# Patient Record
Sex: FEMALE | Race: WHITE | URBAN - METROPOLITAN AREA
[De-identification: names, ages, dates, MRNs, and addresses within clinical notes are randomized per-mention and may not be internally consistent; named-entity substitution may affect disease eponyms.]

---

## 2023-02-15 ENCOUNTER — HOSPITAL ENCOUNTER (OUTPATIENT)
Age: 63
Setting detail: OBSERVATION
Discharge: HOME | End: 2023-02-17
Attending: EMERGENCY MEDICINE | Admitting: INTERNAL MEDICINE
Payer: COMMERCIAL

## 2023-02-15 ENCOUNTER — APPOINTMENT (OUTPATIENT)
Dept: GENERAL RADIOLOGY | Age: 63
End: 2023-02-15
Payer: COMMERCIAL

## 2023-02-15 ENCOUNTER — APPOINTMENT (OUTPATIENT)
Dept: CT IMAGING | Age: 63
End: 2023-02-15
Payer: COMMERCIAL

## 2023-02-15 DIAGNOSIS — R55 SYNCOPE AND COLLAPSE: Primary | ICD-10-CM

## 2023-02-15 DIAGNOSIS — R07.9 ACUTE CHEST PAIN: ICD-10-CM

## 2023-02-15 LAB
A/G RATIO: 1.4 (ref 1.1–2.2)
ALBUMIN SERPL-MCNC: 4.2 G/DL (ref 3.4–5)
ALP BLD-CCNC: 85 U/L (ref 40–129)
ALT SERPL-CCNC: 12 U/L (ref 10–40)
ANION GAP SERPL CALCULATED.3IONS-SCNC: 9 MMOL/L (ref 3–16)
AST SERPL-CCNC: 17 U/L (ref 15–37)
BANDED NEUTROPHILS RELATIVE PERCENT: 1 % (ref 0–7)
BASOPHILS ABSOLUTE: 0 K/UL (ref 0–0.2)
BASOPHILS RELATIVE PERCENT: 0 %
BILIRUB SERPL-MCNC: 0.4 MG/DL (ref 0–1)
BUN BLDV-MCNC: 8 MG/DL (ref 7–20)
CALCIUM SERPL-MCNC: 9.8 MG/DL (ref 8.3–10.6)
CHLORIDE BLD-SCNC: 102 MMOL/L (ref 99–110)
CO2: 26 MMOL/L (ref 21–32)
CREAT SERPL-MCNC: 0.6 MG/DL (ref 0.6–1.2)
D DIMER: 1.15 UG/ML FEU (ref 0–0.6)
EOSINOPHILS ABSOLUTE: 2.7 K/UL (ref 0–0.6)
EOSINOPHILS RELATIVE PERCENT: 24 %
GFR SERPL CREATININE-BSD FRML MDRD: >60 ML/MIN/{1.73_M2}
GLUCOSE BLD-MCNC: 103 MG/DL (ref 70–99)
HCT VFR BLD CALC: 42.6 % (ref 36–48)
HEMATOLOGY PATH CONSULT: YES
HEMOGLOBIN: 14.2 G/DL (ref 12–16)
LYMPHOCYTES ABSOLUTE: 2.1 K/UL (ref 1–5.1)
LYMPHOCYTES RELATIVE PERCENT: 19 %
MCH RBC QN AUTO: 30.7 PG (ref 26–34)
MCHC RBC AUTO-ENTMCNC: 33.2 G/DL (ref 31–36)
MCV RBC AUTO: 92.4 FL (ref 80–100)
MONOCYTES ABSOLUTE: 0.7 K/UL (ref 0–1.3)
MONOCYTES RELATIVE PERCENT: 6 %
NEUTROPHILS ABSOLUTE: 5.7 K/UL (ref 1.7–7.7)
NEUTROPHILS RELATIVE PERCENT: 50 %
PDW BLD-RTO: 12.7 % (ref 12.4–15.4)
PLATELET # BLD: 386 K/UL (ref 135–450)
PMV BLD AUTO: 7.5 FL (ref 5–10.5)
POTASSIUM REFLEX MAGNESIUM: 3.9 MMOL/L (ref 3.5–5.1)
PRO-BNP: 11 PG/ML (ref 0–124)
RBC # BLD: 4.62 M/UL (ref 4–5.2)
SODIUM BLD-SCNC: 137 MMOL/L (ref 136–145)
TOTAL PROTEIN: 7.2 G/DL (ref 6.4–8.2)
TROPONIN: <0.01 NG/ML
WBC # BLD: 11.2 K/UL (ref 4–11)

## 2023-02-15 PROCEDURE — G0378 HOSPITAL OBSERVATION PER HR: HCPCS

## 2023-02-15 PROCEDURE — 85379 FIBRIN DEGRADATION QUANT: CPT

## 2023-02-15 PROCEDURE — 71260 CT THORAX DX C+: CPT | Performed by: EMERGENCY MEDICINE

## 2023-02-15 PROCEDURE — 71045 X-RAY EXAM CHEST 1 VIEW: CPT

## 2023-02-15 PROCEDURE — 83880 ASSAY OF NATRIURETIC PEPTIDE: CPT

## 2023-02-15 PROCEDURE — 6360000002 HC RX W HCPCS: Performed by: EMERGENCY MEDICINE

## 2023-02-15 PROCEDURE — 96375 TX/PRO/DX INJ NEW DRUG ADDON: CPT

## 2023-02-15 PROCEDURE — 6360000004 HC RX CONTRAST MEDICATION: Performed by: EMERGENCY MEDICINE

## 2023-02-15 PROCEDURE — 85025 COMPLETE CBC W/AUTO DIFF WBC: CPT

## 2023-02-15 PROCEDURE — 84484 ASSAY OF TROPONIN QUANT: CPT

## 2023-02-15 PROCEDURE — 99285 EMERGENCY DEPT VISIT HI MDM: CPT

## 2023-02-15 PROCEDURE — 6370000000 HC RX 637 (ALT 250 FOR IP): Performed by: INTERNAL MEDICINE

## 2023-02-15 PROCEDURE — 80053 COMPREHEN METABOLIC PANEL: CPT

## 2023-02-15 PROCEDURE — 2580000003 HC RX 258: Performed by: INTERNAL MEDICINE

## 2023-02-15 PROCEDURE — 96374 THER/PROPH/DIAG INJ IV PUSH: CPT

## 2023-02-15 RX ORDER — ONDANSETRON 2 MG/ML
4 INJECTION INTRAMUSCULAR; INTRAVENOUS EVERY 6 HOURS PRN
Status: DISCONTINUED | OUTPATIENT
Start: 2023-02-15 | End: 2023-02-17 | Stop reason: HOSPADM

## 2023-02-15 RX ORDER — FLUTICASONE PROPIONATE AND SALMETEROL 250; 50 UG/1; UG/1
1 POWDER RESPIRATORY (INHALATION) 2 TIMES DAILY
COMMUNITY

## 2023-02-15 RX ORDER — PANTOPRAZOLE SODIUM 40 MG/1
40 TABLET, DELAYED RELEASE ORAL
Status: DISCONTINUED | OUTPATIENT
Start: 2023-02-16 | End: 2023-02-15 | Stop reason: ALTCHOICE

## 2023-02-15 RX ORDER — LANOLIN ALCOHOL/MO/W.PET/CERES
3 CREAM (GRAM) TOPICAL NIGHTLY PRN
COMMUNITY

## 2023-02-15 RX ORDER — ONDANSETRON 4 MG/1
4 TABLET, ORALLY DISINTEGRATING ORAL EVERY 8 HOURS PRN
Status: DISCONTINUED | OUTPATIENT
Start: 2023-02-15 | End: 2023-02-17 | Stop reason: HOSPADM

## 2023-02-15 RX ORDER — SODIUM CHLORIDE 0.9 % (FLUSH) 0.9 %
5-40 SYRINGE (ML) INJECTION PRN
Status: DISCONTINUED | OUTPATIENT
Start: 2023-02-15 | End: 2023-02-17 | Stop reason: HOSPADM

## 2023-02-15 RX ORDER — LEVOTHYROXINE SODIUM 0.15 MG/1
150 TABLET ORAL
Status: DISCONTINUED | OUTPATIENT
Start: 2023-02-16 | End: 2023-02-17 | Stop reason: HOSPADM

## 2023-02-15 RX ORDER — METHYLPREDNISOLONE SODIUM SUCCINATE 125 MG/2ML
125 INJECTION, POWDER, LYOPHILIZED, FOR SOLUTION INTRAMUSCULAR; INTRAVENOUS ONCE
Status: COMPLETED | OUTPATIENT
Start: 2023-02-15 | End: 2023-02-15

## 2023-02-15 RX ORDER — FAMOTIDINE 20 MG/1
20 TABLET, FILM COATED ORAL 2 TIMES DAILY
Status: DISCONTINUED | OUTPATIENT
Start: 2023-02-15 | End: 2023-02-17 | Stop reason: HOSPADM

## 2023-02-15 RX ORDER — POLYETHYLENE GLYCOL 3350 17 G/17G
17 POWDER, FOR SOLUTION ORAL DAILY PRN
Status: DISCONTINUED | OUTPATIENT
Start: 2023-02-15 | End: 2023-02-17 | Stop reason: HOSPADM

## 2023-02-15 RX ORDER — DIPHENHYDRAMINE HYDROCHLORIDE 50 MG/ML
50 INJECTION INTRAMUSCULAR; INTRAVENOUS ONCE
Status: COMPLETED | OUTPATIENT
Start: 2023-02-15 | End: 2023-02-15

## 2023-02-15 RX ORDER — ACETAMINOPHEN 650 MG/1
650 SUPPOSITORY RECTAL EVERY 6 HOURS PRN
Status: DISCONTINUED | OUTPATIENT
Start: 2023-02-15 | End: 2023-02-16

## 2023-02-15 RX ORDER — CETIRIZINE HYDROCHLORIDE 10 MG/1
10 TABLET ORAL 2 TIMES DAILY
Status: DISCONTINUED | OUTPATIENT
Start: 2023-02-15 | End: 2023-02-17 | Stop reason: HOSPADM

## 2023-02-15 RX ORDER — ENOXAPARIN SODIUM 100 MG/ML
40 INJECTION SUBCUTANEOUS DAILY
Status: DISCONTINUED | OUTPATIENT
Start: 2023-02-16 | End: 2023-02-17 | Stop reason: HOSPADM

## 2023-02-15 RX ORDER — HYDROXYZINE HYDROCHLORIDE 25 MG/1
25 TABLET, FILM COATED ORAL NIGHTLY PRN
COMMUNITY

## 2023-02-15 RX ORDER — MONTELUKAST SODIUM 10 MG/1
10 TABLET ORAL NIGHTLY
COMMUNITY

## 2023-02-15 RX ORDER — SODIUM CHLORIDE 9 MG/ML
INJECTION, SOLUTION INTRAVENOUS PRN
Status: DISCONTINUED | OUTPATIENT
Start: 2023-02-15 | End: 2023-02-17 | Stop reason: HOSPADM

## 2023-02-15 RX ORDER — BIOTIN 1 MG
1000 TABLET ORAL DAILY
COMMUNITY

## 2023-02-15 RX ORDER — IRBESARTAN 75 MG/1
75 TABLET ORAL NIGHTLY
COMMUNITY

## 2023-02-15 RX ORDER — ROSUVASTATIN CALCIUM 10 MG/1
5 TABLET, COATED ORAL NIGHTLY
Status: DISCONTINUED | OUTPATIENT
Start: 2023-02-15 | End: 2023-02-17 | Stop reason: HOSPADM

## 2023-02-15 RX ORDER — SODIUM CHLORIDE 0.9 % (FLUSH) 0.9 %
5-40 SYRINGE (ML) INJECTION EVERY 12 HOURS SCHEDULED
Status: DISCONTINUED | OUTPATIENT
Start: 2023-02-15 | End: 2023-02-17 | Stop reason: HOSPADM

## 2023-02-15 RX ORDER — ACETAMINOPHEN 325 MG/1
650 TABLET ORAL EVERY 6 HOURS PRN
Status: DISCONTINUED | OUTPATIENT
Start: 2023-02-15 | End: 2023-02-16

## 2023-02-15 RX ADMIN — DIPHENHYDRAMINE HYDROCHLORIDE 50 MG: 50 INJECTION, SOLUTION INTRAMUSCULAR; INTRAVENOUS at 12:50

## 2023-02-15 RX ADMIN — FAMOTIDINE 20 MG: 20 TABLET, FILM COATED ORAL at 21:39

## 2023-02-15 RX ADMIN — SODIUM CHLORIDE, PRESERVATIVE FREE 10 ML: 5 INJECTION INTRAVENOUS at 21:40

## 2023-02-15 RX ADMIN — ACETAMINOPHEN 650 MG: 325 TABLET ORAL at 21:42

## 2023-02-15 RX ADMIN — ROSUVASTATIN 5 MG: 10 TABLET, FILM COATED ORAL at 21:40

## 2023-02-15 RX ADMIN — IOPAMIDOL 75 ML: 755 INJECTION, SOLUTION INTRAVENOUS at 12:59

## 2023-02-15 RX ADMIN — METHYLPREDNISOLONE SODIUM SUCCINATE 125 MG: 125 INJECTION, POWDER, FOR SOLUTION INTRAMUSCULAR; INTRAVENOUS at 12:50

## 2023-02-15 ASSESSMENT — PAIN DESCRIPTION - ORIENTATION: ORIENTATION: ANTERIOR

## 2023-02-15 ASSESSMENT — HEART SCORE: ECG: 0

## 2023-02-15 ASSESSMENT — LIFESTYLE VARIABLES
HOW OFTEN DO YOU HAVE A DRINK CONTAINING ALCOHOL: MONTHLY OR LESS
HOW MANY STANDARD DRINKS CONTAINING ALCOHOL DO YOU HAVE ON A TYPICAL DAY: 1 OR 2

## 2023-02-15 ASSESSMENT — PAIN DESCRIPTION - LOCATION: LOCATION: HEAD

## 2023-02-15 ASSESSMENT — PAIN SCALES - GENERAL
PAINLEVEL_OUTOF10: 3
PAINLEVEL_OUTOF10: 0

## 2023-02-15 ASSESSMENT — PAIN DESCRIPTION - DESCRIPTORS: DESCRIPTORS: ACHING

## 2023-02-15 NOTE — PROGRESS NOTES
Pharmacy Home Medication Reconciliation Note    A medication reconciliation has been completed for Rebecca Plasencia 1960    Pharmacy: [facility] provided by: Patient     The patient's home medication list is as follows: No current facility-administered medications on file prior to encounter. Current Outpatient Medications on File Prior to Encounter   Medication Sig Dispense Refill    FAMOTIDINE PO Take 20 mg by mouth in the morning and at bedtime      Cetirizine HCl (ZYRTEC ALLERGY PO) Take 10 mg by mouth 4 times daily (before meals and nightly)      Biotin 1000 MCG TABS Take 1,000 mcg by mouth daily      hyoscyamine (LEVSIN/SL) 0.125 MG SL tablet Place 1-2 tablets under the tongue every 4 hours as needed for Cramping for 5 days. 15 tablet 0    ondansetron (ZOFRAN) 4 MG tablet Take 1 tablet by mouth every 8 hours as needed for Nausea. 11 tablet 0    rosuvastatin (CRESTOR) 5 MG tablet Take 5 mg by mouth every evening      budesonide-formoterol (SYMBICORT) 160-4.5 MCG/ACT AERO Inhale 2 puffs into the lungs 2 times daily. (Patient not taking: Reported on 2/15/2023)      omeprazole (PRILOSEC) 20 MG capsule Take 20 mg by mouth daily. levothyroxine (SYNTHROID) 125 MCG tablet Take 125 mcg by mouth Daily. hydrochlorothiazide (MICROZIDE) 12.5 MG capsule Take 12.5 mg by mouth 2 times daily      valsartan (DIOVAN) 80 MG tablet Take 80 mg by mouth as needed      aspirin 81 MG tablet Take 81 mg by mouth daily. (Patient not taking: Reported on 2/15/2023)         Patient is no longer taking Aspirin 81 mg tablet, Hyoscyamine 0.125 mg SL Tablet, and Ondansetron 4 mg Tablet    Timing of last doses updated.     Thank you,  Karol Aviles [facility]

## 2023-02-15 NOTE — H&P
HOSPITALISTS HISTORY AND PHYSICAL    2/15/2023 6:36 PM    Patient Information:  Rajni Baron is a 58 y.o. female [id number]  PCP:  Gerry Elizabeth (Tel: [phone] )    Chief complaint:    Chief Complaint   Patient presents with    Fall     Arrived per family d/t \"fainting\", chest pains that are intermittent, and a fall in the [place] [id number]        History of Present Illness: Eden Turner is a 58 y.o. female who was seen on the toilet yesterday when she suddenly passed out and went down to the ground did not have any lightheadedness dizziness shortness of breath nausea vomiting before this. Patient came to the ED to get herself checked out and on the walk to the ED patient felt lightheaded short of breath with chest tightness and passed out again. Patient states had third Kathie Mcbride in 21 and since then has been having hives on and off along with sweats eyebrows have fallen off and has been losing hair has seen allergist and has started Xolair. Denies having a previous cardiac work-up no echo in the past      REVIEW OF SYSTEMS:   Constitutional: Negative for fever,chills or night sweats  ENT: Negative for rhinorrhea, epistaxis, hoarseness, sore throat. Respiratory: Negative for shortness of breath,wheezing  Cardiovascular: see above  Gastrointestinal: Negative for nausea, vomiting, diarrhea  Genitourinary: Negative for polyuria, dysuria   Hematologic/Lymphatic: Negative for bleeding tendency, easy bruising  Musculoskeletal: Negative for myalgias and arthralgias  Neurologic: Negative for confusion,dysarthria. Skin: Negative for itching,rash  Psychiatric: Negative for depression,anxiety, agitation. Endocrine: Negative for polydipsia,polyuria,heat /cold intolerance.     Past Medical History:   has a past medical history of Back pain, GERD (gastroesophageal reflux disease), Hyperlipidemia, Hypertension, and Thyroid disease. Past Surgical History:   has a past surgical history that includes Cholecystectomy. Medications:  No current facility-administered medications on file prior to encounter. Current Outpatient Medications on File Prior to Encounter   Medication Sig Dispense Refill    FAMOTIDINE PO Take 20 mg by mouth in the morning and at bedtime      Cetirizine HCl (ZYRTEC ALLERGY PO) Take 10 mg by mouth 4 times daily (before meals and nightly)      Biotin 1000 MCG TABS Take 1,000 mcg by mouth daily      hyoscyamine (LEVSIN/SL) 0.125 MG SL tablet Place 1-2 tablets under the tongue every 4 hours as needed for Cramping for 5 days. 15 tablet 0    ondansetron (ZOFRAN) 4 MG tablet Take 1 tablet by mouth every 8 hours as needed for Nausea. 11 tablet 0    rosuvastatin (CRESTOR) 5 MG tablet Take 5 mg by mouth every evening      budesonide-formoterol (SYMBICORT) 160-4.5 MCG/ACT AERO Inhale 2 puffs into the lungs 2 times daily. (Patient not taking: Reported on 2/15/2023)      omeprazole (PRILOSEC) 20 MG capsule Take 20 mg by mouth daily. levothyroxine (SYNTHROID) 125 MCG tablet Take 125 mcg by mouth Daily. hydrochlorothiazide (MICROZIDE) 12.5 MG capsule Take 12.5 mg by mouth 2 times daily      valsartan (DIOVAN) 80 MG tablet Take 80 mg by mouth as needed      aspirin 81 MG tablet Take 81 mg by mouth daily. (Patient not taking: Reported on 2/15/2023)         Allergies: Allergies   Allergen Reactions    Codeine Anaphylaxis    Iodine Shortness Of Breath     Pt had contrast in 2016 at [facility] with noted allergy in their record. Social History:  Patient Lives at home   reports that she has never smoked. She does not have any smokeless tobacco history on file. She reports current alcohol use. She reports that she does not use drugs. Family History:  family history includes Diabetes in her unknown relative;  Hypertension in her unknown relative. ,     Physical Exam:  BP (!) 139/113   Pulse 97 Temp 99.2 °F (37.3 °C) (Oral)   Resp 17   SpO2 95%     General appearance:  Appears comfortable. AAOx3  HEENT: atraumatic, Pupils equal, muscous membranes moist, no masses appreciated  Cardiovascular: tachycardia no murmurs appreciated  Respiratory: CTAB no wheezing  Gastrointestinal: Abdomen soft, non-tender, BS+  EXT: no edema  Neurology: no gross focal deficts  Psychiatry: Appropriate affect. Not agitated  Skin: Warm, dry, no rashes appreciated    Labs:  CBC:   Lab Results   Component Value Date/Time    WBC 11.2 02/15/2023 11:15 AM    RBC 4.62 02/15/2023 11:15 AM    HGB 14.2 02/15/2023 11:15 AM    HCT 42.6 02/15/2023 11:15 AM    MCV 92.4 02/15/2023 11:15 AM    MCH 30.7 02/15/2023 11:15 AM    MCHC 33.2 02/15/2023 11:15 AM    RDW 12.7 02/15/2023 11:15 AM     02/15/2023 11:15 AM    MPV 7.5 02/15/2023 11:15 AM     BMP:    Lab Results   Component Value Date/Time     02/15/2023 11:15 AM    K 3.9 02/15/2023 11:15 AM     02/15/2023 11:15 AM    CO2 26 02/15/2023 11:15 AM    BUN 8 02/15/2023 11:15 AM    CREATININE 0.6 02/15/2023 11:15 AM    CALCIUM 9.8 02/15/2023 11:15 AM    GFRAA >60 11/07/2013 02:25 PM    GFRAA >60 03/20/2013 05:20 AM    LABGLOM >60 02/15/2023 11:15 AM    GLUCOSE 103 02/15/2023 11:15 AM     CT CHEST PULMONARY EMBOLISM W CONTRAST   Final Result   No evidence of pulmonary embolism or acute pulmonary abnormality. Evidence of prior granulomatous disease. XR CHEST PORTABLE   Final Result   No acute cardiopulmonary findings. Recent imaging reviewed    Problem List  Principal Problem:    Syncope and collapse  Resolved Problems:    * No resolved hospital problems. *        Assessment/Plan:   Syncope and collapse  - tele  - echo  - orthostatics  - troponin  - pt ot  - cards consult  - ct pe negative        DVT prophylaxis lovenox  Code status full code        Please note that some part of this chart was generated using Dragon dictation software.  Although every effort was made to ensure the accuracy of this automated transcription, some errors in transcription may have occurred inadvertently. If you may need any clarification, please do not hesitate to contact me through [facility].        Laura Mercer MD    2/15/2023 6:36 PM

## 2023-02-15 NOTE — ED NOTES
Patient wheeled to bathroom by this RN. Patient denied any dizziness and stated she was steady enough on her feet to remain in bathroom alone.       Haven Beckett RN  02/15/23 3862

## 2023-02-15 NOTE — ED NOTES
Pt was walking to [address] [place] [address] 5 via the assistance of ER tech. Writer was standing in bay 4 and noted legs to buckle under pt and ER tech lowered pt to the floor. Writer noted pt to be yelling on the way to the floor. No LOC. Did not hit head. And pt was tearful. Assistance x2 was called to assist pt to stretcher. MD was coming out of a [address] [place] [address] and noted pt to be lying on the floor prior to assistance. Pt was offered a wc at the front check in and refused. Fall band placed. WC to be initiated. Education provided. Non-slip foot wear in place.       Gagandeep Rodarte RN  02/15/23 6292

## 2023-02-15 NOTE — ED PROVIDER NOTES
EMERGENCY MEDICINE PROVIDER NOTE    Patient Identification  Pt Name: Marley Ontiveros  MRN: [id number]  [place] 1960  Date of evaluation: 2/15/2023  Provider: Gilberto Roper MD  PCP: Claudean Heman    Chief Complaint  Fall (Arrived per family d/t \"fainting\", chest pains that are intermittent, and a fall in the [facility])      HPI  (History provided by {Persons; family members:[id number]})  This is a 58 y.o. female who was brought in by {[facility]:75785} for ***. I have reviewed the following nursing documentation:  Allergies: Codeine and Iodine    Past medical history:   Past Medical History:   Diagnosis Date    Back pain     GERD (gastroesophageal reflux disease)     Hyperlipidemia     Hypertension     Thyroid disease     hypothyroid     Past surgical history:   Past Surgical History:   Procedure Laterality Date    CHOLECYSTECTOMY         Home medications:   Previous Medications    ASPIRIN 81 MG TABLET    Take 81 mg by mouth daily. BIOTIN 1000 MCG TABS    Take 1,000 mcg by mouth daily    BUDESONIDE-FORMOTEROL (SYMBICORT) 160-4.5 MCG/ACT AERO    Inhale 2 puffs into the lungs 2 times daily. CETIRIZINE HCL (ZYRTEC ALLERGY PO)    Take 10 mg by mouth 4 times daily (before meals and nightly)    FAMOTIDINE PO    Take 20 mg by mouth in the morning and at bedtime    HYDROCHLOROTHIAZIDE (MICROZIDE) 12.5 MG CAPSULE    Take 12.5 mg by mouth 2 times daily    HYOSCYAMINE (LEVSIN/SL) 0.125 MG SL TABLET    Place 1-2 tablets under the tongue every 4 hours as needed for Cramping for 5 days. LEVOTHYROXINE (SYNTHROID) 125 MCG TABLET    Take 125 mcg by mouth Daily. OMEPRAZOLE (PRILOSEC) 20 MG CAPSULE    Take 20 mg by mouth daily. ONDANSETRON (ZOFRAN) 4 MG TABLET    Take 1 tablet by mouth every 8 hours as needed for Nausea. ROSUVASTATIN (CRESTOR) 5 MG TABLET    Take 5 mg by mouth every evening    VALSARTAN (DIOVAN) 80 MG TABLET    Take 80 mg by mouth as needed       Social history:  reports that she has never smoked.  She does not daily    HYOSCYAMINE (LEVSIN/SL) 0.125 MG SL TABLET    Place 1-2 tablets under the tongue every 4 hours as needed for Cramping for 5 days. LEVOTHYROXINE (SYNTHROID) 125 MCG TABLET    Take 125 mcg by mouth Daily. OMEPRAZOLE (PRILOSEC) 20 MG CAPSULE    Take 20 mg by mouth daily. ONDANSETRON (ZOFRAN) 4 MG TABLET    Take 1 tablet by mouth every 8 hours as needed for Nausea. ROSUVASTATIN (CRESTOR) 5 MG TABLET    Take 5 mg by mouth every evening    VALSARTAN (DIOVAN) 80 MG TABLET    Take 80 mg by mouth as needed       Social history:  reports that she has never smoked. She does not have any smokeless tobacco history on file. She reports current alcohol use. She reports that she does not use drugs. Family history:    Family History   Problem Relation Age of Onset    Diabetes Unknown     Hypertension Unknown        Exam  ED Triage Vitals [02/15/23 1042]   BP Temp Temp Source Heart Rate Resp SpO2 Height Weight   (!) 150/105 99.2 °F (37.3 °C) Oral 100 22 98 % -- --     Nursing note and vitals reviewed. General: Nontoxic and in no acute distress  Head: Normocephalic and atraumatic. ENT: Face symmetric. No facial bone deformity. No bruising. Eyes: Anicteric sclera. No discharge. Neck: Supple. Trachea midline. Cervical spine nontender to palpation. Cardiovascular: RRR; no murmurs. Pulmonary/Chest: Effort normal. No respiratory distress. Clear to auscultation bilaterally. No wheezes. Abdominal: Soft. No distension. Nontender to palpation. No palpable mass. Musculoskeletal: No lower extremity edema. Neurological: Alert and oriented. Face symmetric. Speech is clear. Skin: Warm and dry. No rash. EKG  The Ekg interpreted by me in the absence of a cardiologist shows. normal sinus rhythm with a rate of 74  Axis is   Normal  QTc is   prolonged  Intervals and Durations are unremarkable.       Nonspecific ST segment flattening in the anterior and lateral leads with T wave inversions in the lateral limb 19.0 %    Monocytes % 6.0 %    Eosinophils % 24.0 %    Basophils % 0.0 %    Neutrophils Absolute 5.7 1.7 - 7.7 K/uL    Lymphocytes Absolute 2.1 1.0 - 5.1 K/uL    Monocytes Absolute 0.7 0.0 - 1.3 K/uL    Eosinophils Absolute 2.7 (H) 0.0 - 0.6 K/uL    Basophils Absolute 0.0 0.0 - 0.2 K/uL    Bands Relative 1 0 - 7 %   Comprehensive Metabolic Panel w/ Reflex to MG   Result Value Ref Range    Sodium 137 136 - 145 mmol/L    Potassium reflex Magnesium 3.9 3.5 - 5.1 mmol/L    Chloride 102 99 - 110 mmol/L    CO2 26 21 - 32 mmol/L    Anion Gap 9 3 - 16    Glucose 103 (H) 70 - 99 mg/dL    BUN 8 7 - 20 mg/dL    Creatinine 0.6 0.6 - 1.2 mg/dL    Est, Glom Filt Rate >60 >60    Calcium 9.8 8.3 - 10.6 mg/dL    Total Protein 7.2 6.4 - 8.2 g/dL    Albumin 4.2 3.4 - 5.0 g/dL    Albumin/Globulin Ratio 1.4 1.1 - 2.2    Total Bilirubin 0.4 0.0 - 1.0 mg/dL    Alkaline Phosphatase 85 40 - 129 U/L    ALT 12 10 - 40 U/L    AST 17 15 - 37 U/L   D-Dimer, Quantitative   Result Value Ref Range    D-Dimer, Quant 1.15 (H) 0.00 - 0.60 ug/mL FEU   Brain Natriuretic Peptide   Result Value Ref Range    Pro-BNP 11 0 - 124 pg/mL   Troponin   Result Value Ref Range    Troponin <0.01 <0.01 ng/mL     SEP-1  Is this patient to be included in the SEP-1 Core Measure due to severe sepsis or septic shock?    No   Exclusion criteria - the patient is NOT to be included for SEP-1 Core Measure due to:  2+ SIRS criteria are not met     Screenings  Heart Score for chest pain patients  History: Highly Suspicious  ECG: Normal  Patient Age: > 39 and < 65 years  *Risk factors for Atherosclerotic disease: Hypercholesterolemia, Hypertension, Obesity  Risk Factors: > 3 Risk factors or history of atherosclerotic disease*  Troponin: < 1X normal limit  Heart Score Total: 5     Medications Given During ED Visit  Medications   iopamidol (ISOVUE-370) 76 % injection 75 mL (75 mLs IntraVENous Given 2/15/23 6100)   diphenhydrAMINE (BENADRYL) injection 50 mg (50 mg IntraVENous Given 2/15/23 1250)   methylPREDNISolone sodium (SOLU-MEDROL) injection 125 mg (125 mg IntraVENous Given 2/15/23 1250)     Records Reviewed  ***    Social Determinants of Health  {Social Determinants of Health (EM 2023) (Optional):59890}    Chronic Conditions affecting care   has a past medical history of Back pain, GERD (gastroesophageal reflux disease), Hyperlipidemia, Hypertension, and Thyroid disease. MDM and ED Course  Patient presented to the emergency department with intermittent chest pain for the last 3 days, often brought on by exertion and improved with rest.  Patient  has also had multiple brief syncopal episodes over the last few days. She had a near syncopal episode here in the emergency department when being walked to her bed. brief. When I evaluated her, her generalized weakness and lightheadedness had resulted. Her chest presentation was highly suspicious, presenting very typically with tightness throughout the mid chest with radiation down the left arm. Initial EKG showed nonspecific ST segment flattening in the anterior and lateral leads with T wave inversions in the lateral limb leads I and aVL. Given these findings, I am highly concerned for a cardiogenic etiology of her syncope and the possibility of ACS. Fortunately, her initial troponin is normal.    I also considered pulmonary ballismus my evaluation. Her D-dimer was significantly elevated at 1.15. Follow-up CT showed no evidence of pulmonary embolism or other acute pulmonary abnormality. She does not meet criteria for sepsis. Of note, patient has a complicated recent medical history. 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 She has had multiple symptoms over many months. However, it was her syncope and chest pain that brought her here today. She has only had these acute symptoms over the last 3 days.     I the possibility of ACS. Fortunately, her initial troponin is normal.    I also considered pulmonary embolus in my evaluation of the patient. Her D-dimer was significantly elevated at 1.15. Follow-up CT showed no evidence of pulmonary embolism or other acute pulmonary abnormality. I considered pneumonia or sepsis. Patient does not meet criteria for sepsis. Pneumonia was not seen on chest x-ray. Of note, patient has a complicated recent medical history. 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 She has had multiple symptoms over many months. However, it was her syncope and chest pain that brought her here today. She has only had these acute symptoms over the last 3 days. I consulted Dr Anaya Mims through [facility] for admission. He reviewed the patient's history, physical exam, labs, imaging studies, and emergency department course and has decided to admit [facility] for further evaluation and treatment. The total Critical Care time is 30 minutes which excludes separately billable procedures. Final Impression  1. Syncope and collapse    2. Acute chest pain      Blood pressure (!) 143/85, pulse 82, temperature 99.2 °F (37.3 °C), temperature source Oral, resp. rate 21, SpO2 97 %. Disposition:  Admit    This chart was generated using the [address] [place] [address] dictation system. I created this record but it may contain dictation errors given the limitations of this technology.        Eugene Perez MD  03/07/23 6479

## 2023-02-16 ENCOUNTER — APPOINTMENT (OUTPATIENT)
Dept: GENERAL RADIOLOGY | Age: 63
End: 2023-02-16
Payer: COMMERCIAL

## 2023-02-16 LAB
ANION GAP SERPL CALCULATED.3IONS-SCNC: 9 MMOL/L (ref 3–16)
BASOPHILS ABSOLUTE: 0 K/UL (ref 0–0.2)
BASOPHILS RELATIVE PERCENT: 0.3 %
BUN BLDV-MCNC: 11 MG/DL (ref 7–20)
CALCIUM SERPL-MCNC: 9.9 MG/DL (ref 8.3–10.6)
CHLORIDE BLD-SCNC: 105 MMOL/L (ref 99–110)
CO2: 25 MMOL/L (ref 21–32)
CREAT SERPL-MCNC: <0.5 MG/DL (ref 0.6–1.2)
EKG ATRIAL RATE: 94 BPM
EKG DIAGNOSIS: NORMAL
EKG P AXIS: 50 DEGREES
EKG P-R INTERVAL: 178 MS
EKG Q-T INTERVAL: 386 MS
EKG QRS DURATION: 90 MS
EKG QTC CALCULATION (BAZETT): 482 MS
EKG R AXIS: 10 DEGREES
EKG T AXIS: 40 DEGREES
EKG VENTRICULAR RATE: 94 BPM
EOSINOPHILS ABSOLUTE: 0 K/UL (ref 0–0.6)
EOSINOPHILS RELATIVE PERCENT: 0 %
GFR SERPL CREATININE-BSD FRML MDRD: >60 ML/MIN/{1.73_M2}
GLUCOSE BLD-MCNC: 136 MG/DL (ref 70–99)
HCT VFR BLD CALC: 41.4 % (ref 36–48)
HEMATOLOGY PATH CONSULT: NORMAL
HEMOGLOBIN: 13.6 G/DL (ref 12–16)
LV EF: 70 %
LVEF MODALITY: NORMAL
LYMPHOCYTES ABSOLUTE: 1.5 K/UL (ref 1–5.1)
LYMPHOCYTES RELATIVE PERCENT: 11.1 %
MCH RBC QN AUTO: 30.5 PG (ref 26–34)
MCHC RBC AUTO-ENTMCNC: 33 G/DL (ref 31–36)
MCV RBC AUTO: 92.5 FL (ref 80–100)
MONOCYTES ABSOLUTE: 0.9 K/UL (ref 0–1.3)
MONOCYTES RELATIVE PERCENT: 6.8 %
NEUTROPHILS ABSOLUTE: 10.9 K/UL (ref 1.7–7.7)
NEUTROPHILS RELATIVE PERCENT: 81.8 %
PDW BLD-RTO: 12.9 % (ref 12.4–15.4)
PLATELET # BLD: 387 K/UL (ref 135–450)
PMV BLD AUTO: 7.1 FL (ref 5–10.5)
POTASSIUM REFLEX MAGNESIUM: 4 MMOL/L (ref 3.5–5.1)
PROCALCITONIN: 0.03 NG/ML (ref 0–0.15)
RBC # BLD: 4.47 M/UL (ref 4–5.2)
SODIUM BLD-SCNC: 139 MMOL/L (ref 136–145)
TSH REFLEX FT4: 0.93 UIU/ML (ref 0.27–4.2)
WBC # BLD: 13.3 K/UL (ref 4–11)

## 2023-02-16 PROCEDURE — 84443 ASSAY THYROID STIM HORMONE: CPT

## 2023-02-16 PROCEDURE — 6370000000 HC RX 637 (ALT 250 FOR IP): Performed by: NURSE PRACTITIONER

## 2023-02-16 PROCEDURE — G0378 HOSPITAL OBSERVATION PER HR: HCPCS

## 2023-02-16 PROCEDURE — 87070 CULTURE OTHR SPECIMN AEROBIC: CPT

## 2023-02-16 PROCEDURE — 6370000000 HC RX 637 (ALT 250 FOR IP): Performed by: INTERNAL MEDICINE

## 2023-02-16 PROCEDURE — 97161 PT EVAL LOW COMPLEX 20 MIN: CPT

## 2023-02-16 PROCEDURE — 93005 ELECTROCARDIOGRAM TRACING: CPT | Performed by: NURSE PRACTITIONER

## 2023-02-16 PROCEDURE — 97165 OT EVAL LOW COMPLEX 30 MIN: CPT

## 2023-02-16 PROCEDURE — 6360000002 HC RX W HCPCS: Performed by: NURSE PRACTITIONER

## 2023-02-16 PROCEDURE — 97116 GAIT TRAINING THERAPY: CPT

## 2023-02-16 PROCEDURE — 93010 ELECTROCARDIOGRAM REPORT: CPT | Performed by: INTERNAL MEDICINE

## 2023-02-16 PROCEDURE — 85025 COMPLETE CBC W/AUTO DIFF WBC: CPT

## 2023-02-16 PROCEDURE — 6360000004 HC RX CONTRAST MEDICATION: Performed by: INTERNAL MEDICINE

## 2023-02-16 PROCEDURE — 73030 X-RAY EXAM OF SHOULDER: CPT | Performed by: RADIOLOGY

## 2023-02-16 PROCEDURE — 99223 1ST HOSP IP/OBS HIGH 75: CPT | Performed by: INTERNAL MEDICINE

## 2023-02-16 PROCEDURE — C8929 TTE W OR WO FOL WCON,DOPPLER: HCPCS

## 2023-02-16 PROCEDURE — 94640 AIRWAY INHALATION TREATMENT: CPT

## 2023-02-16 PROCEDURE — 36415 COLL VENOUS BLD VENIPUNCTURE: CPT

## 2023-02-16 PROCEDURE — 96375 TX/PRO/DX INJ NEW DRUG ADDON: CPT

## 2023-02-16 PROCEDURE — 84145 PROCALCITONIN (PCT): CPT

## 2023-02-16 PROCEDURE — 93306 TTE W/DOPPLER COMPLETE: CPT

## 2023-02-16 PROCEDURE — 2580000003 HC RX 258: Performed by: INTERNAL MEDICINE

## 2023-02-16 PROCEDURE — 87205 SMEAR GRAM STAIN: CPT

## 2023-02-16 PROCEDURE — 80048 BASIC METABOLIC PNL TOTAL CA: CPT

## 2023-02-16 RX ORDER — M-VIT,TX,IRON,MINS/CALC/FOLIC 27MG-0.4MG
1 TABLET ORAL DAILY
Status: DISCONTINUED | OUTPATIENT
Start: 2023-02-16 | End: 2023-02-16

## 2023-02-16 RX ORDER — M-VIT,TX,IRON,MINS/CALC/FOLIC 27MG-0.4MG
1 TABLET ORAL DAILY
COMMUNITY

## 2023-02-16 RX ORDER — FLUTICASONE PROPIONATE AND SALMETEROL 250; 50 UG/1; UG/1
1 POWDER RESPIRATORY (INHALATION) 2 TIMES DAILY
Status: DISCONTINUED | OUTPATIENT
Start: 2023-02-16 | End: 2023-02-17 | Stop reason: HOSPADM

## 2023-02-16 RX ORDER — OMEGA-3-ACID ETHYL ESTERS 1 G/1
1 CAPSULE, LIQUID FILLED ORAL 2 TIMES DAILY
COMMUNITY

## 2023-02-16 RX ORDER — ASCORBIC ACID 500 MG
500 TABLET ORAL 2 TIMES DAILY
Status: DISCONTINUED | OUTPATIENT
Start: 2023-02-16 | End: 2023-02-17 | Stop reason: HOSPADM

## 2023-02-16 RX ORDER — OMEGA-3S/DHA/EPA/FISH OIL/D3 300MG-1000
400 CAPSULE ORAL DAILY
Status: DISCONTINUED | OUTPATIENT
Start: 2023-02-16 | End: 2023-02-17 | Stop reason: HOSPADM

## 2023-02-16 RX ORDER — MONTELUKAST SODIUM 10 MG/1
10 TABLET ORAL NIGHTLY
Status: DISCONTINUED | OUTPATIENT
Start: 2023-02-16 | End: 2023-02-17 | Stop reason: HOSPADM

## 2023-02-16 RX ORDER — KETOROLAC TROMETHAMINE 30 MG/ML
15 INJECTION, SOLUTION INTRAMUSCULAR; INTRAVENOUS ONCE
Status: COMPLETED | OUTPATIENT
Start: 2023-02-16 | End: 2023-02-16

## 2023-02-16 RX ORDER — LANOLIN ALCOHOL/MO/W.PET/CERES
3 CREAM (GRAM) TOPICAL NIGHTLY PRN
Status: DISCONTINUED | OUTPATIENT
Start: 2023-02-16 | End: 2023-02-17 | Stop reason: HOSPADM

## 2023-02-16 RX ORDER — ASCORBIC ACID 500 MG
500 TABLET ORAL 2 TIMES DAILY
COMMUNITY

## 2023-02-16 RX ORDER — LOSARTAN POTASSIUM 25 MG/1
25 TABLET ORAL DAILY
Status: DISCONTINUED | OUTPATIENT
Start: 2023-02-16 | End: 2023-02-16

## 2023-02-16 RX ORDER — IRBESARTAN 75 MG/1
75 TABLET ORAL DAILY
Status: DISCONTINUED | OUTPATIENT
Start: 2023-02-16 | End: 2023-02-17 | Stop reason: HOSPADM

## 2023-02-16 RX ORDER — ALBUTEROL SULFATE 90 UG/1
2 AEROSOL, METERED RESPIRATORY (INHALATION) EVERY 4 HOURS PRN
Status: DISCONTINUED | OUTPATIENT
Start: 2023-02-16 | End: 2023-02-17 | Stop reason: HOSPADM

## 2023-02-16 RX ORDER — AZELASTINE 1 MG/ML
1 SPRAY, METERED NASAL 2 TIMES DAILY
COMMUNITY

## 2023-02-16 RX ORDER — ACETAMINOPHEN 500 MG
1000 TABLET ORAL EVERY 6 HOURS PRN
Status: DISCONTINUED | OUTPATIENT
Start: 2023-02-16 | End: 2023-02-17 | Stop reason: HOSPADM

## 2023-02-16 RX ADMIN — MONTELUKAST SODIUM 10 MG: 10 TABLET, FILM COATED ORAL at 20:28

## 2023-02-16 RX ADMIN — CETIRIZINE HYDROCHLORIDE 10 MG: 10 TABLET, FILM COATED ORAL at 09:29

## 2023-02-16 RX ADMIN — SODIUM CHLORIDE, PRESERVATIVE FREE 10 ML: 5 INJECTION INTRAVENOUS at 20:29

## 2023-02-16 RX ADMIN — FAMOTIDINE 20 MG: 20 TABLET, FILM COATED ORAL at 20:28

## 2023-02-16 RX ADMIN — OXYCODONE HYDROCHLORIDE AND ACETAMINOPHEN 500 MG: 500 TABLET ORAL at 09:29

## 2023-02-16 RX ADMIN — ROSUVASTATIN 5 MG: 10 TABLET, FILM COATED ORAL at 20:28

## 2023-02-16 RX ADMIN — ACETAMINOPHEN 1000 MG: 500 TABLET ORAL at 17:46

## 2023-02-16 RX ADMIN — KETOROLAC TROMETHAMINE 15 MG: 30 INJECTION, SOLUTION INTRAMUSCULAR; INTRAVENOUS at 14:01

## 2023-02-16 RX ADMIN — IRBESARTAN 75 MG: 75 TABLET ORAL at 12:46

## 2023-02-16 RX ADMIN — PERFLUTREN 1.5 ML: 6.52 INJECTION, SUSPENSION INTRAVENOUS at 11:08

## 2023-02-16 RX ADMIN — SODIUM CHLORIDE, PRESERVATIVE FREE 10 ML: 5 INJECTION INTRAVENOUS at 09:28

## 2023-02-16 RX ADMIN — CETIRIZINE HYDROCHLORIDE 10 MG: 10 TABLET, FILM COATED ORAL at 20:28

## 2023-02-16 RX ADMIN — FLUTICASONE PROPIONATE AND SALMETEROL 1 PUFF: 250; 50 POWDER RESPIRATORY (INHALATION) at 19:54

## 2023-02-16 RX ADMIN — CHOLECALCIFEROL (VITAMIN D3) 10 MCG (400 UNIT) TABLET 400 UNITS: at 09:29

## 2023-02-16 RX ADMIN — LEVOTHYROXINE SODIUM 150 MCG: 0.15 TABLET ORAL at 05:23

## 2023-02-16 RX ADMIN — FAMOTIDINE 20 MG: 20 TABLET, FILM COATED ORAL at 09:29

## 2023-02-16 RX ADMIN — MELATONIN TAB 3 MG 3 MG: 3 TAB at 20:28

## 2023-02-16 RX ADMIN — OXYCODONE HYDROCHLORIDE AND ACETAMINOPHEN 500 MG: 500 TABLET ORAL at 20:29

## 2023-02-16 ASSESSMENT — PAIN DESCRIPTION - LOCATION
LOCATION: SHOULDER
LOCATION: HEAD

## 2023-02-16 ASSESSMENT — PAIN SCALES - GENERAL
PAINLEVEL_OUTOF10: 6
PAINLEVEL_OUTOF10: 0
PAINLEVEL_OUTOF10: 6
PAINLEVEL_OUTOF10: 0

## 2023-02-16 ASSESSMENT — PAIN DESCRIPTION - ORIENTATION: ORIENTATION: RIGHT

## 2023-02-16 ASSESSMENT — PAIN DESCRIPTION - DESCRIPTORS
DESCRIPTORS: ACHING
DESCRIPTORS: ACHING

## 2023-02-16 NOTE — PROGRESS NOTES
02/16/23 1246   RT Protocol   History Pulmonary Disease 0   Respiratory pattern 0   Breath sounds 2   Cough 0   Bronchodilator Assessment Score 2

## 2023-02-16 NOTE — CONSULTS
[address]   Electrophysiology Consultation     Date: 2/16/2023  Reason for Consultation: Syncope   Consult Requesting Physician: Jason Douglas MD     CC: chest pain, fainted      HPI:   Chad Sandy is a 58 y.o. female no prior cardiac history had a sudden onset loss of consciousness while  walking to the toilet. She had just arose from a seated position when she felt warm and then next thing she remembers is waking up on the floor. Denies preceding symptoms of dizziness, lightheadedness, nausea, palpitations or racing heart. Had second syncopal episode while walking into the emergency department, no preceding symptoms. She has had syncopal episodes in the past, seen at [facility] 10/14/22 following 2 syncope episodes, one while in the shower, at that time she documented low systolic [id number], the other surrounding episode of nausea/vomiting/diarrhea. 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 She does not have symptoms of dizziness or LH but feels these symptoms when her blood pressure is very low. Review of System:  Complete 10 point ROS performed and negative unless noted in above HPI or below    Prior to Admission medications    Medication Sig Start Date End Date Taking?  Authorizing Provider   ascorbic acid (VITAMIN C) 500 MG tablet Take 500 mg by mouth 2 times daily   Yes Historical Provider, MD   omega-3 acid ethyl esters (LOVAZA) 1 g capsule Take 1 g by mouth 2 times daily   Yes Historical Provider, MD   azelastine (ASTELIN) 0.1 % nasal spray 1 spray by Nasal route 2 times daily Use in each nostril as directed   Yes Historical Provider, MD   Cholecalciferol (VITAMIN D) 10 MCG (400 UNIT) CAPS Capsule Take 400 Units by mouth daily   Yes Historical Provider, MD   Multiple Vitamins-Minerals (THERAPEUTIC MULTIVITAMIN-MINERALS) tablet Take 1 tablet by mouth daily   Yes Historical Provider, MD FAMOTIDINE PO Take 20 mg by mouth in the morning and at bedtime   Yes Historical Provider, MD   Cetirizine HCl (ZYRTEC ALLERGY PO) Take 10 mg by mouth 2 times daily 1 tablet in am and 1 in pm   Yes Historical Provider, MD   Biotin 1000 MCG TABS Take 1,000 mcg by mouth daily   Yes Historical Provider, MD   melatonin 3 MG TABS tablet Take 3 mg by mouth nightly as needed   Yes Historical Provider, MD   hydrOXYzine HCl (ATARAX) 25 MG tablet Take 25 mg by mouth nightly as needed for Itching   Yes Historical Provider, MD   montelukast (SINGULAIR) 10 MG tablet Take 10 mg by mouth nightly   Yes Historical Provider, MD   irbesartan (AVAPRO) 75 MG tablet Take 75 mg by mouth at bedtime   Yes Historical Provider, MD   fluticasone-salmeterol (ADVAIR) 250-50 MCG/ACT AEPB diskus inhaler Inhale 1 puff into the lungs 2 times daily   Yes Historical Provider, MD   hyoscyamine (LEVSIN/SL) 0.125 MG SL tablet Place 1-2 tablets under the tongue every 4 hours as needed for Cramping for 5 days. 11/7/13 11/12/13  Ira Hamm MD   ondansetron (ZOFRAN) 4 MG tablet Take 1 tablet by mouth every 8 hours as needed for Nausea. Patient not taking: Reported on 2/15/2023 11/7/13   David Valenzuela MD   rosuvastatin (CRESTOR) 5 MG tablet Take 5 mg by mouth every evening    Historical Provider, MD   budesonide-formoterol (SYMBICORT) 160-4.5 MCG/ACT AERO Inhale 2 puffs into the lungs 2 times daily. Patient not taking: Reported on 2/15/2023    Historical Provider, MD   omeprazole (PRILOSEC) 20 MG capsule Take 20 mg by mouth daily.   Patient not taking: Reported on 2/15/2023    Historical Provider, MD   levothyroxine (SYNTHROID) 125 MCG tablet Take 150 mcg by mouth daily Monday through [address]30 [address]  Provider, MD   hydrochlorothiazide (MICROZIDE) 12.5 MG capsule Take 12.5 mg by mouth 2 times daily    Historical Provider, MD   valsartan (DIOVAN) 80 MG tablet Take 80 mg by mouth as needed  Patient not taking: Reported on 2/15/2023 Historical Provider, MD   aspirin 81 MG tablet Take 81 mg by mouth daily. Patient not taking: No sig reported    Historical Provider, MD       Past Medical History:   Diagnosis Date    Back pain     GERD (gastroesophageal reflux disease)     Hyperlipidemia     Hypertension     Thyroid disease     hypothyroid        Past Surgical History:   Procedure Laterality Date    ABDOMINOPLASTY      CHOLECYSTECTOMY      HYSTERECTOMY (CERVIX STATUS UNKNOWN)      REVERSE TOTAL SHOULDER ARTHROPLASTY Right     2022       Allergies   Allergen Reactions    Codeine Anaphylaxis    Iodine Shortness Of Breath     Pt had contrast in 2016 at [address] [place] [address] with noted allergy in their record. Lisinopril Cough       Social History:  Reviewed. reports that she has never smoked. She does not have any smokeless tobacco history on file. She reports current alcohol use. She reports that she does not use drugs. Family History:  Reviewed. No family history of SCD. Daughter with hemochromatosis and son with hemophilia trait?       Physical Examination:  BP (!) 162/87   Pulse 98   Temp 99.1 °F (37.3 °C) (Oral)   Resp 20   Ht 5' 6\" (1.676 m)   Wt 178 lb (80.7 kg)   SpO2 95%   BMI 28.73 kg/m²   Temp  Av.4 °F (36.9 °C)  Min: 97.6 °F (36.4 °C)  Max: 99.2 °F (37.3 °C)  Pulse  Av  Min: 79  Max: 105  BP  Min: 127/86  Max: 162/87  SpO2  Av.6 %  Min: 93 %  Max: 100 %    Intake/Output Summary (Last 24 hours) at 2023 0805  Last data filed at 2/15/2023 2319  Gross per 24 hour   Intake 480 ml   Output --   Net 480 ml     NAD  Moist oral mucosa, non icteric conjunctiva  Chest clear non labored   Heart regular rate and rhythm, no murmur, no LE swelling  Abd soft non tender  Strength grossly preserved   No focal neuro deficits   Appropriate mood and affect      Labs:    WBC 13.3  K 4.0  Cr 0.05  Hgb 13.6  Troponin negative      EKG   Sinus rhythm, prolonged Qtc 490    EKG   Sinus rhythm, QTC 460    Stress    Cath    ECHO 2/16   -Technically difficult due to breast implants. -Definity was administered.   -Normal left ventricle size and wall thickness   -LV systolic function is hyperdynamic with an estimated ejection fraction of   70%. With contrast there appears to be near cavity obliteration in the   apical area. -No regional wall motion abnormalities are seen.   -Indeterminate diastolic function. Stress ECHO 2019   1. Negative stress echo with no indication of inducible ischemia. 2. Negative ECG for ischemia with graded exercise test.    3. Duke Treadmill Score is 7 which indicates low risk. 4. Stress echo findings indicate low risk for future ischemic       event . Assessment/Plan:     Syncope, recurrent  Hyperdynamic ventricle, LVEF 70%    - multiple episodes of syncope in the last year, most sound neurocardiogenic,orthostatic negative here in hospital, no clear trigger, unexplained event in the ED with no preceding symptoms  - remote EKG in the 2013 with prolonged QT,  current EKG with normal QT  - 30 day monitor   - ECHO with difficulty visualizing, hyper dynamic finding can look like hypertrophic cardiomyopathy including apical variant, wall thickness was noted to be normal. We discussed the implications/possibility of apical variant hypertrophy, no family hx of cardiomyopathy, unusual age at presentation, more likely to be benign abnormal finding, discussed additional imaging with cMRI given limited echo quality, can be done outpatient, no NSVT or VT on tele  - follow up in EP clinic in 2-3 months    NOTE: This report was transcribed using voice recognition software. Every effort was made to ensure accuracy, however, inadvertent computerized transcription errors may be present.      Delaney Castro MD  [address]   Office: [phone]  Fax: [phone]

## 2023-02-16 NOTE — PROGRESS NOTES
[address] [facility] Department   Phone: [phone]    Physical Therapy    [x] Initial Evaluation            [] Daily Treatment Note         [x] Discharge Summary      Patient: Rebecca Plasencia   : 1960   MRN: [id number]   Date of Service:  2023  Admitting Diagnosis: Syncope and collapse  Current Admission Summary: Rebecca Plasencia is a 58 y.o. female who was seen on the toilet yesterday when she suddenly passed out and went down to the ground did not have any lightheadedness dizziness shortness of breath nausea vomiting before this. Patient came to the ED to get herself checked out and on the walk to the ED patient felt lightheaded short of breath with chest tightness and passed out again. Patient states had third Jerrod Keen in  and since then has been having hives on and off along with sweats eyebrows have fallen off and has been losing hair has seen allergist and has started Xolair. Past Medical History:  has a past medical history of Back pain, GERD (gastroesophageal reflux disease), Hyperlipidemia, Hypertension, and Thyroid disease. Past Surgical History:  has a past surgical history that includes Cholecystectomy; Reverse total shoulder arthroplasty (Right); Hysterectomy; and Abdominoplasty. Discharge Recommendations: Rebecca Plasencia scored a 24/24 on the AM-PAC short mobility form. At this time, no further PT is recommended upon discharge due to presentation at baseline function. Recommend patient returns to prior setting with prior services.       DME Required For Discharge: None  Precautions/Restrictions: medium fall risk  Weight Bearing Restrictions: no restrictions     Required Braces/Orthotics: no braces required  Positional Restrictions:no positional restrictions    Pre-Admission Information   Lives With: family         Type of Home: house  Home Layout: one level  Home Access: level entry  [employer]: tub/shower unit  Toilet Height: elevated height  Bathroom Equipment:  no equipment  Home Equipment: rolling walker  Transfer Assistance: Independent without use of device  Ambulation Assistance:Independent without use of device  ADL Assistance: independent with all ADL's  IADL Assistance: independent with homemaking tasks  Active :        [x] Yes                 [] No  Hand Dominance: [] Left                 [x] Right  Current Employment: retired. Occupation: realtor , sells [employer]  Hobbies: spending time with family and friends, kayaking, dancing  Recent Falls: 1 fall in home, 1 fall in ED. No other falls in last 6 months    Examination   Vision:   Vision Gross Assessment: Impaired and Vision Corrective Device: wears glasses at all times  Hearing:   [email or web address]    Sensation:   Reports occasional numbness/tingling in fingertips on L hand  ROM:   BLE WFL  Strength:   BLE WFL  Decision Making: low complexity  Clinical Presentation: stable      Subjective  General: Supine in bed upon arrival. Agreeable to therapy. States \"soreness\" in R shoulder  Pain: 0/10  Pain Interventions: not applicable       Functional Mobility  Bed Mobility  Supine to sit: Independent  Sit to supine: Independent  Comments:  Transfers  Sit to stand: Independent  Stand to sit:  Independent  Comments:  Ambulation  Surface:level surface  Assistive Device: no device  Assistance: Independent  Distance: 300'  Gait Mechanics: steady gait, decreased mikel  Comments:    Stair Mobility  Not performed    Balance  Static Sitting Balance: good: independent with functional balance in unsupported position  Dynamic Sitting Balance: good: independent with functional balance in unsupported position  Static Standing Balance: good: independent with functional balance in unsupported position  Dynamic Standing Balance: good: independent with functional balance in unsupported position  Comments:    Other Therapeutic Interventions    Functional Outcomes  AM-PAC Inpatient Mobility Raw Score : 24 Cognition  WFL  Orientation:    alert and oriented x 4  Command Following:   [facility]    Education  Barriers To Learning: none  Patient Education: patient educated on goals, PT role and benefits, disease specific education  Learning Assessment:  patient verbalizes and demonstrates understanding    Assessment  Activity Tolerance: good  Impairments Requiring Therapeutic Intervention: none- eval with same day discharge  Clinical Assessment: Patient presents at baseline function. No further acute PT needs. Safety Interventions: patient left in bed, call light within reach, nurse notified, and family/caregiver present    Plan  Frequency: Eval with same day discharge - No follow up required. Current Treatment Recommendations: not applicable, evaluation completed with same day discharge. Goals  Short Term Goals:    Patient eval with same day discharge. No goals set as patient is at baseline mobility status. Therapy Session Time      Individual Group Co-treatment   Time In 0000   1329   Time Out     1400   Minutes     31     Timed Code Treatment Minutes:   0  Total Treatment Minutes:  31  Units billed shared with OT due to observation status.          Electronically Signed By: Tra Mansfield PT      Thanks, Tra Mansfield PT, DPT [id number]

## 2023-02-16 NOTE — PROGRESS NOTES
Physical/OccupationalTherapy  [place] [facility]    Attempted to see pt for PT/OT evaluation. Patient currently off the floor for testing. Will hold therapy at this time and will follow up with pt as schedule permits.  Thanks, Roxanne Medina, PT, DPT [id number], Beena Block M.Ed., OTR/L [id number]

## 2023-02-16 NOTE — RT PROTOCOL NOTE
RT Nebulizer Bronchodilator Protocol Note    There is a bronchodilator order in the chart from a provider indicating to follow the RT Bronchodilator Protocol and there is an Initiate RT Bronchodilator Protocol order as well (see protocol at bottom of note). CXR Findings:  XR CHEST PORTABLE    Result Date: 2/15/2023  No acute cardiopulmonary findings. The findings from the last RT Protocol Assessment were as follows:  Smoking: None or smoker <15 pack years  Respiratory Pattern: Regular pattern and RR 12-20 bpm  Breath Sounds: Slightly diminished and/or crackles  Cough: Strong, spontaneous, non-productive  Indication for Bronchodilator Therapy:    Bronchodilator Assessment Score: 2    Aerosolized bronchodilator medication orders have been revised according to the RT Nebulizer Bronchodilator Protocol below. Respiratory Therapist to perform RT Therapy Protocol Assessment initially then follow the protocol. Repeat RT Therapy Protocol Assessment PRN for score 0-3 or on second treatment, BID, and PRN for scores above 3. No Indications - adjust the frequency to every 6 hours PRN wheezing or bronchospasm, if no treatments needed after 48 hours then discontinue using Per Protocol order mode. If indication present, adjust the RT bronchodilator orders based on the Bronchodilator Assessment Score as indicated below. If a patient is on this medication at home then do not decrease Frequency below that used at home. 0-3 - enter or revise RT bronchodilator order(s) to equivalent RT Bronchodilator order with Frequency of every 4 hours PRN for wheezing or increased work of breathing using Per Protocol order mode. 4-6 - enter or revise RT Bronchodilator order(s) to two equivalent RT bronchodilator orders with one order with BID Frequency and one order with Frequency of every 4 hours PRN wheezing or increased work of breathing using Per Protocol order mode.          7-10 - enter or revise RT Bronchodilator order(s) to two equivalent RT bronchodilator orders with one order with TID Frequency and one order with Frequency of every 4 hours PRN wheezing or increased work of breathing using Per Protocol order mode. 11-13 - enter or revise RT Bronchodilator order(s) to one equivalent RT bronchodilator order with QID Frequency and an Albuterol order with Frequency of every 4 hours PRN wheezing or increased work of breathing using Per Protocol order mode. Greater than 13 - enter or revise RT Bronchodilator order(s) to one equivalent RT bronchodilator order with every 4 hours Frequency and an Albuterol order with Frequency of every 2 hours PRN wheezing or increased work of breathing using Per Protocol order mode. RT to enter RT Home Evaluation for COPD & MDI Assessment order using Per Protocol order mode.     Electronically signed by Bjorn Rankin RCP on 2/16/2023 at 12:46 PM

## 2023-02-16 NOTE — FLOWSHEET NOTE
Pt admitted from ed c/o headache denies pain elsewhere. Orthostatics completed and pt oriented to room. Bed alarm armed and call light in reach. Med rec completed and pt updated with POC.

## 2023-02-16 NOTE — PROGRESS NOTES
Patient seen in ED, room 19. Admission completed except for: 4 Eyes Assessment, Immunizations, Covid Vaccines, Rights and Responsibilities, Orientation to room, Plan of Care, education, white board, height and weight, pain assessment and head to toe assessment. Patient is alert and oriented X 4. Patient lives at home with her spouse and is being admitted for syncope and collapse. Home Medication Reconciliation has been completed per pharmacy. Plan of care updated if indicated. All questions answered. Spouse is at bedside.

## 2023-02-16 NOTE — FLOWSHEET NOTE
4 Eyes Skin Assessment     NAME:  Hermilo Guthrie  YOB: 1960  MEDICAL RECORD NUMBER:  [id number]    The patient is being assessed for  Admission    I agree that One RN has performed a thorough Head to Toe Skin Assessment on the patient. ALL assessment sites listed below have been assessed. Areas assessed by both nurses:    Head, Face, Ears, Shoulders, Back, Chest, Arms, Elbows, Hands, Sacrum. Buttock, Coccyx, Ischium, and Legs. Feet and Heels        Does the Patient have a Wound?  No noted wound(s)       Prosper Prevention initiated by RN: NA   Wound Care Orders initiated by RN: NA    Pressure Injury (Stage 3,4, Unstageable, DTI, NWPT, and Complex wounds) if present, place referral order by RN under : NA    New and Established Ostomies, if present place, referral order under : NA      Nurse 1 eSignature: Electronically signed by Cirilo Ko RN on 2/15/23 at 11:12 PM EST    SHARE this note so that the co-signing nurse can place an eSignature    Nurse 2 eSignature: Electronically signed by Indio Tapia RN on 2/15/23 at 11:13 PM EST

## 2023-02-16 NOTE — PROGRESS NOTES
[facility] Department   Phone: [phone]    Occupational Therapy    [x] Initial Evaluation            [] Daily Treatment Note         [x] Discharge Summary      Patient: Reji Schmidt   : 1960   MRN: [id number]   Date of Service:  2023    Admitting Diagnosis:  Syncope and collapse  Current Admission Summary: Reji Schmidt is a 58 y.o. female who was seen on the toilet yesterday when she suddenly passed out and went down to the ground did not have any lightheadedness dizziness shortness of breath nausea vomiting before this. Patient came to the ED to get herself checked out and on the walk to the ED patient felt lightheaded short of breath with chest tightness and passed out again. Patient states had third Avoca Gonzalez in  and since then has been having hives on and off along with sweats eyebrows have fallen off and has been losing hair has seen allergist and has started Xolair. Denies having a previous cardiac work-up no echo in the past  Past Medical History:  has a past medical history of Back pain, GERD (gastroesophageal reflux disease), Hyperlipidemia, Hypertension, and Thyroid disease. Past Surgical History:  has a past surgical history that includes Cholecystectomy; Reverse total shoulder arthroplasty (Right); Hysterectomy; and Abdominoplasty. Discharge Recommendations: Reji Schmidt scored a 24/24 on the AM-PAC ADL Inpatient form. At this time, no further OT is recommended upon discharge due to patient at independent level. Recommend patient returns to prior setting with prior services.       DME Required For Discharge: No DME required    Precautions/Restrictions: medium fall risk  Positional Restrictions:no positional restrictions    Pre-Admission Information   Lives With: family    Type of Home: house  Home Layout: one level  Home Access: level entry  [facility]: tub/shower unit  Toilet Height: elevated height  Bathroom Equipment:  no equipment  Home Equipment: rolling walker  Transfer Assistance: Independent without use of device  Ambulation Assistance:Independent without use of device  ADL Assistance: independent with all ADL's  IADL Assistance: independent with homemaking tasks  Active :        [x] Yes  [] No  Hand Dominance: [] Left  [x] Right  Current Employment: retired. 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 No other falls in last 6 months    Examination   Vision:   Vision Gross Assessment: Impaired and Vision Corrective Device: wears glasses at all times  Hearing:   [facility]  Sensation:   reports numbness and tingling in (L) UE occasionally   ROM:   (B) UE ROM WFL  Strength:   (B) UE gross strength WFL    Therapist Clinical Decision Making (Complexity): low complexity  Clinical Presentation: stable      Subjective  General: Pt reclined in bed upon therapy arrival. Pt pleasant and agreeable to OT/PT eval.  Pain: 0/10 R shoulder ache  Pain Interventions: not applicable        Activities of Daily Living  Basic Activities of Daily Living  General Comments: Pt declined ADL tasks, but reported independence with all ADLs  Instrumental Activities of Daily Living  No IADL completed on this date. Functional Mobility  Bed Mobility  Supine to Sit: Independent  Sit to Supine: Independent  Comments:  Transfers  Sit to stand transfer:Independent  Stand to sit transfer: Independent  Comments:  Functional Mobility:  Sitting Balance: Independent. Sitting Balance Comment: Pt sat EOB for ~5 min   Standing Balance: Independent. Functional Mobility: . Independent  Functional Mobility Activity: around unit  Functional Mobility Device Use: no device  Functional Mobility Comment: Pt ambulated ~300 ft.  Steady pace, no LOB    Other Therapeutic Interventions    Functional Outcomes  AM-PAC Inpatient Daily Activity Raw Score: 24    Cognition  WFL  Orientation:    A&O x 4  Command Following:   [facility]     Education  Barriers To Learning: none  Patient Education: Patient educated on OT role and benefits, discharge recommendations  Learning Assessment:  Patient verbalized and demonstrates understanding    Assessment  Activity Tolerance: Pt tolerated session well. Impairments Requiring Therapeutic Intervention: none - eval with same day discharge  Prognosis: good without need for therapy intervention  Clinical Assessment: Patient presenting at independent level for completion of required self care tasks for return to home. Eval with d/c at this time. No therapy services indicated. Safety Interventions: patient left in bed, call light within reach, gait belt, nurse notified, and family/caregiver present    Plan  Frequency: Eval with same day discharge. No follow up required. Current Treatment Recommendations: Not applicable, evaluation completed with same day discharge. Goals  Patient eval with same day discharge. No goals set as patient is at baseline self care status. Therapy Session Time     Individual Group Co-treatment   Time In    1330   Time Out    1400   Minutes    30        Timed Code Treatment Minutes:   0 min (time shared with PT due to pt being observation status)  Total Treatment Minutes:  30 min       Electronically Signed By: RAFI Ferris, S/OT  I have directly observed this treatment and have read and approve this note.    Catracho Beckett., OTR/L, [id number]

## 2023-02-16 NOTE — PLAN OF CARE
Problem: Discharge Planning  Goal: Discharge to home or other facility with appropriate resources  Outcome: Progressing     Problem: Pain  Goal: Verbalizes/displays adequate comfort level or baseline comfort level  Outcome: Progressing     Problem: Safety - Adult  Goal: Free from fall injury  Outcome: Progressing     Problem: Neurosensory - Adult  Goal: Achieves stable or improved neurological status  Outcome: Progressing

## 2023-02-16 NOTE — PROGRESS NOTES
[facility]    2/16/2023 8:51 AM        Name: Taras Card . Admitted: 2/15/2023  Primary Care Provider: Jairo Hubbard (Tel: [phone])      Subjective: Lion Dejah Taras Card is a 58 y.o. female with a past medical history of hypertension, hyperlipidemia, GERD, hypothyroid disease, and asthma who presented with syncopal episode while on the toilet 2/14 and had recurrent in the ED. Reports that 2/14 she was sitting on the toilet, felt hot and flushed and when she woke up with her face on her toilet paper colby. Reportedly she was walking in the ER (2/15) to her room and she had sudden LOC, no prodromal symptoms with this, woke up on the floor. Denies any post-ictal symptoms and when she awakes she returns to normal.      Interval History: Today, Admits that she passed out several years ago in setting of nausea and vomiting and she was noted to have severe hypokalemia. She was recently started on potassium supplement, however stopped them recently. Recent injection of Xolair (Omalizumab) 2/10    Reports mild discomfort in the right shoulder with recent surgery, this was assessed in ER. Reports discomfort with movement, no functional deficits. Admits to intermittent and mild HA least 2-3 days. No CP, SOB, swelling. Admits to productive cough with yellow sputum, wheezes, hx of asthma. Independently reviewed interval ancillary notes from cardiology. Problem List  Principal Problem:    Syncope and collapse  Resolved Problems:    * No resolved hospital problems.  *     Assessment and Plan:    Syncope  - Repeat orthostatic BP  - CTPA neg for PE  -Etiology remains unclear  ~ Differential diagnosis include cardiac arrhythmias, seizure, orthostatic hypotension, or more likely vasovagal syncope  -Diagnostic options discussed   ~ Echo to assess for LV and valvular function   ~ Telemetry has been negative so far  -Avoid dehydration with adequate fluid intake and increased sodium intake  -Recommend lying down and elevating legs for future prodromal symptoms  -Discussed specific techniques to decrease pooling of blood in legs such as foot exercises or tensing leg muscles when standing  -20-30mmHg compression stockings recommended      - Cardiology has been consulted   - PT/OT   - Orthostatic negative   - Recent Xolair injection, possible lat response? Low risk SE if syncope and headache    Asthma, moderate persistent/Cough   - On albuterol and advair at home, resume   - Wheezes on exam, off inhaler, CXR clear, no SOB, admits to productive cough   - Sputum culture added, will add procal    - Start inhalers, follow echo and cards consultation, consider discharge later today if [id number] with cards and echo negative  - TSH    Discussed care with patient and nursing  Discussed case, diagnostics and labs, and plan of care with Dr. Marcia Gentile, will plan for discharge later today if testing is negative and if cards have no additional recs. Diet: ADULT DIET;  Regular; Low Fat/Low Chol/High Fiber/2 gm Na  Code:Full Code  DVT PPX: Lovenox PPX  Disposition: Home when medially able, PT/OT pending, unlikely to require placement    Current Medications  cetirizine (ZYRTEC) tablet 10 mg, BID  famotidine (PEPCID) tablet 20 mg, BID  levothyroxine (SYNTHROID) tablet 150 mcg, Once per day on Mon Tue Wed Thu Fri  rosuvastatin (CRESTOR) tablet 5 mg, Nightly  perflutren lipid microspheres (DEFINITY) injection 1.5 mL, ONCE PRN  sodium chloride flush 0.9 % injection 5-40 mL, 2 times per day  sodium chloride flush 0.9 % injection 5-40 mL, PRN  0.9 % sodium chloride infusion, PRN  enoxaparin (LOVENOX) injection 40 mg, Daily  ondansetron (ZOFRAN-ODT) disintegrating tablet 4 mg, Q8H PRN   Or  ondansetron (ZOFRAN) injection 4 mg, Q6H PRN  polyethylene glycol (GLYCOLAX) packet 17 g, Daily PRN  acetaminophen (TYLENOL) tablet 650 mg, Q6H PRN Or  acetaminophen (TYLENOL) suppository 650 mg, Q6H PRN      Objective:  BP (!) 162/87   Pulse 98   Temp 99.1 °F (37.3 °C) (Oral)   Resp 20   Ht 5' 6\" (1.676 m)   Wt 178 lb (80.7 kg)   SpO2 95%   BMI 28.73 kg/m²   Vitals:    02/16/23 0750   BP: (!) 162/87   Pulse: 98   Resp: 20   Temp: 99.1 °F (37.3 °C)   SpO2: 95%       Intake/Output Summary (Last 24 hours) at 2/16/2023 5617  Last data filed at 2/15/2023 2319  Gross per 24 hour   Intake 480 ml   Output --   Net 480 ml      Wt Readings from Last 3 Encounters:   02/16/23 178 lb (80.7 kg)     Review of Systems:  Constitutional: No fever, No sudden weight changes, No weakness  Skin: No excessive bruising, No bleeding, No changes in skin pigment, normal turgor + rash, chronic   Respiratory: No SOB, Yes cough, Yes wheezing, No recent URI  Cardiovascular: Negative for CP, palpitations, dizziness, or syncope  Gastrointestinal: No abdominal pain, No nausea, No vomiting, No diarrhea, No constipation, No black/tarry stools  Genito-Urinary: No hematuria, No dysuria, No polyuria,   Musculoskeletal: No pain, No swelling, No new mobility concerns  Endocrine: No excessive sweating, Yes thyroid disease,   Neurological/Psych: No for confusion, No TIA-like symptoms. Denies any acute depression, anxiety, or insomnia. Physical Examination:  Telemetry:Personally Reviewed Normal sinus rhythm  Constitutional: Cooperative and in no apparent distress, appears well nourished, No obesity  Skin: Warm and pink; no cyanosis, bruising, or clubbing, No lesions/incisions + mild redness on forearms, reportedly chronic rash  HEENT: Symmetric and normocephalic. Conjunctiva pink with clear sclera. Mucus membranes pink and moist.   Cardiovascular: regular rate and rhythm. S1 & S2, negative for murmurs. Peripheral pulses 2+, No peripheral edema  Respiratory: Respirations symmetric and unlabored.  Lungs to auscultation bilaterally, no crackles, or rhonchi + wheezes throughout  Gastrointestinal: Abdomen soft and round. normal bowel sounds. No tenderness  Musculoskeletal: No focal weakness, muscle strength 5/5 bilaterally  Neurologic/Psych: Awake and orientated to person, place and time. Calm affect, appropriate mood. Pertinent labs, diagnostic, and imaging results reviewed as a part of this visit    Labs and Tests:  CBC:   Recent Labs     02/15/23  1115 02/16/23  0510   WBC 11.2* 13.3*   HGB 14.2 13.6    387     BMP:    Recent Labs     02/15/23  1115 02/16/23  0510    139   K 3.9 4.0    105   CO2 26 25   BUN 8 11   CREATININE 0.6 <0.5*   GLUCOSE 103* 136*     Hepatic:   Recent Labs     02/15/23  1115   AST 17   ALT 12   BILITOT 0.4   ALKPHOS 85     Relevant results:  CXR: 2/15/2023  No acute cardiopulmonary findings. CTPE: 2/15/2023  No evidence of pulmonary embolism or acute pulmonary abnormality. Evidence of prior granulomatous disease. U/A: none    ECG:none  Echo: 2/16/2023   Summary   -Technically difficult due to breast implants. -Definity was administered.   -Normal left ventricle size and wall thickness   -LV systolic function is hyperdynamic with an estimated ejection fraction of   70%. With contrast there appears to be near cavity obliteration in the   apical area. -No regional wall motion abnormalities are seen.   -Indeterminate diastolic function.       Signature    CHASTITY Ocasio - CNP   2/16/2023 8:51 AM

## 2023-02-17 VITALS
HEART RATE: 89 BPM | RESPIRATION RATE: 16 BRPM | HEIGHT: 66 IN | DIASTOLIC BLOOD PRESSURE: 76 MMHG | BODY MASS INDEX: 28.42 KG/M2 | SYSTOLIC BLOOD PRESSURE: 118 MMHG | OXYGEN SATURATION: 96 % | TEMPERATURE: 98 F | WEIGHT: 176.8 LBS

## 2023-02-17 DIAGNOSIS — I42.2 HYPERTROPHIC CARDIOMYOPATHY (HCC): Primary | ICD-10-CM

## 2023-02-17 DIAGNOSIS — R55 SYNCOPE AND COLLAPSE: Primary | ICD-10-CM

## 2023-02-17 PROCEDURE — 94761 N-INVAS EAR/PLS OXIMETRY MLT: CPT

## 2023-02-17 PROCEDURE — 6370000000 HC RX 637 (ALT 250 FOR IP): Performed by: NURSE PRACTITIONER

## 2023-02-17 PROCEDURE — G0378 HOSPITAL OBSERVATION PER HR: HCPCS

## 2023-02-17 PROCEDURE — 94640 AIRWAY INHALATION TREATMENT: CPT

## 2023-02-17 PROCEDURE — 6370000000 HC RX 637 (ALT 250 FOR IP): Performed by: INTERNAL MEDICINE

## 2023-02-17 PROCEDURE — 2580000003 HC RX 258: Performed by: INTERNAL MEDICINE

## 2023-02-17 RX ORDER — ALBUTEROL SULFATE 90 UG/1
2 AEROSOL, METERED RESPIRATORY (INHALATION) EVERY 4 HOURS PRN
Qty: 18 G | Refills: 0 | Status: SHIPPED | OUTPATIENT
Start: 2023-02-17

## 2023-02-17 RX ORDER — GUAIFENESIN 600 MG/1
600 TABLET, EXTENDED RELEASE ORAL 2 TIMES DAILY
Qty: 30 TABLET | Refills: 0 | COMMUNITY
Start: 2023-02-17

## 2023-02-17 RX ORDER — GUAIFENESIN 600 MG/1
600 TABLET, EXTENDED RELEASE ORAL 2 TIMES DAILY
Status: DISCONTINUED | OUTPATIENT
Start: 2023-02-17 | End: 2023-02-17 | Stop reason: HOSPADM

## 2023-02-17 RX ADMIN — LEVOTHYROXINE SODIUM 150 MCG: 0.15 TABLET ORAL at 05:18

## 2023-02-17 RX ADMIN — SODIUM CHLORIDE, PRESERVATIVE FREE 10 ML: 5 INJECTION INTRAVENOUS at 08:44

## 2023-02-17 RX ADMIN — GUAIFENESIN 600 MG: 600 TABLET, EXTENDED RELEASE ORAL at 12:45

## 2023-02-17 RX ADMIN — IRBESARTAN 75 MG: 75 TABLET ORAL at 08:45

## 2023-02-17 RX ADMIN — FAMOTIDINE 20 MG: 20 TABLET, FILM COATED ORAL at 08:44

## 2023-02-17 RX ADMIN — CHOLECALCIFEROL (VITAMIN D3) 10 MCG (400 UNIT) TABLET 400 UNITS: at 08:44

## 2023-02-17 RX ADMIN — CETIRIZINE HYDROCHLORIDE 10 MG: 10 TABLET, FILM COATED ORAL at 08:44

## 2023-02-17 RX ADMIN — OXYCODONE HYDROCHLORIDE AND ACETAMINOPHEN 500 MG: 500 TABLET ORAL at 08:44

## 2023-02-17 RX ADMIN — FLUTICASONE PROPIONATE AND SALMETEROL 1 PUFF: 250; 50 POWDER RESPIRATORY (INHALATION) at 08:08

## 2023-02-17 ASSESSMENT — PAIN SCALES - GENERAL: PAINLEVEL_OUTOF10: 0

## 2023-02-17 NOTE — PLAN OF CARE
Problem: Discharge Planning  Goal: Discharge to home or other facility with appropriate resources  Outcome: Progressing  Flowsheets (Taken 2/16/2023 2104)  Discharge to home or other facility with appropriate resources:   Identify barriers to discharge with patient and caregiver   Identify discharge learning needs (meds, wound care, etc)     Problem: Pain  Goal: Verbalizes/displays adequate comfort level or baseline comfort level  Outcome: Progressing  Flowsheets (Taken 2/16/2023 2104)  Verbalizes/displays adequate comfort level or baseline comfort level:   Encourage patient to monitor pain and request assistance   Assess pain using appropriate pain scale     Problem: Safety - Adult  Goal: Free from fall injury  Outcome: Progressing  Flowsheets (Taken 2/16/2023 2104)  Free From Fall Injury: Instruct family/caregiver on patient safety     Problem: Neurosensory - Adult  Goal: Achieves stable or improved neurological status  Outcome: Progressing  Flowsheets (Taken 2/16/2023 2104)  Achieves stable or improved neurological status: Assess for and report changes in neurological status

## 2023-02-17 NOTE — DISCHARGE SUMMARY
[facility]    Patient Demographics    Patient. Maggie Jesus  Date of Birth. 1960  MRN. [id number]     Primary care provider. Dorie Lerner  (Tel: [phone])    Admit date: 2/15/2023    Discharge date (blank if same as Note Date): Note Date: 2/17/2023     Reason for Hospitalization. Chief Complaint   Patient presents with    Fall     Arrived per family d/t \"fainting\", chest pains that are intermittent, and a fall in the [place]       Significant Findings. Principal Problem:    Syncope and collapse  Resolved Problems:    * No resolved hospital problems. [facility] Course. Maggie Jesus is a 58 y.o. female with a past medical history of hypertension, hyperlipidemia, GERD, hypothyroid disease, and asthma who presented with syncopal episode while on the toilet 2/14 and had recurrent in the ED. Reports that 2/14 she was sitting on the toilet, felt hot and flushed and when she woke up with her face on her toilet paper colby. Reportedly she was walking in the ER (2/15) to her room and she had sudden LOC, no prodromal symptoms with this, woke up on the floor. Denies any post-ictal symptoms and when she awakes she returns to normal.  Evaluated by cardiology and had echo which showed hyperdynamic EF 70%. MCOT and OP cardiac MRI were recommended. Hydrochlorothiazide was stopped. Complains of productive cough, chest imaging did not show acute process. Inhalers prescribed, albuterol given.      Recent injection of Xolair (Omalizumab) 2/10    Assessment and Plan:    Syncope    -Etiology remains unclear  ~ Differential diagnosis include cardiac arrhythmias, seizure, orthostatic hypotension, or more likely vasovagal syncope  -Diagnostic options discussed              ~ Echo to assess for LV and valvular function              ~ Telemetry has been negative so far  ~ CTPA neg for PE  -Avoid dehydration with adequate fluid intake and increased sodium intake  -Recommend lying down and elevating legs for future prodromal symptoms  -Discussed specific techniques to decrease pooling of blood in legs such as foot exercises or tensing leg muscles when standing  -20-30mmHg compression stockings recommended                            - Cardiology recommend MCOT and OP cardiac MRI              - PT/OT with no recs 24/24              - Orthostatic negative              - Recent Xolair injection, possible lat response? Low risk SE if syncope and headache     Asthma, moderate persistent/Cough              - On albuterol and advair at home, resume              - Wheezes on exam, off inhaler, Chest imaging clear, no SOB, admits to productive cough              - Procal normal   - No indication for abx therapy, reviewed with Dr. Laci Saenz, she will follow upas OP with her PCP     - Continue home inhalers and will give rx for albuterol PRN    Reviewed case, diagnostics, and plan of care with Dr. Cori Leahy, will discharge    Problems and results from this hospitalization that need follow up. Syncope; PCP in 1 week, Cardiology 2 months     Significant test results and incidental findings. XR SHOULDER RIGHT (MIN 2 VIEWS)   Final Result   No visualized fracture. CT CHEST PULMONARY EMBOLISM W CONTRAST   Final Result   No evidence of pulmonary embolism or acute pulmonary abnormality. Evidence of prior granulomatous disease. XR CHEST PORTABLE   Final Result   No acute cardiopulmonary findings. Invasive procedures and treatments. None     Consults. IP CONSULT TO CARDIOLOGY    Physical examination on discharge day. /74   Pulse 84   Temp 98.2 °F (36.8 °C) (Oral)   Resp 16   Ht 5' 6\" (1.676 m)   Wt 176 lb 12.8 oz (80.2 kg)   SpO2 95%   BMI 28.54 kg/m²   General appearance. Alert. Looks comfortable. HEENT. Sclera clear. Moist mucus membranes. Cardiovascular. Regular rate and rhythm, normal S1, S2. No murmur. Respiratory.  Not using accessory muscles. To auscultation bilaterally no crackles or rhonchi, + mild inspiratory wheezes, productive cough  Gastrointestinal. Abdomen soft, non-tender, not distended, normal bowel sounds  Neurology. Facial symmetry. No speech deficits. Moving all extremities equally. Extremities. No edema in lower extremities. Skin. Warm, dry, normal turgor    Condition at time of discharge Good    Medication instructions provided to patient at discharge. Medication List        START taking these medications      albuterol sulfate  (90 Base) MCG/ACT inhaler  Commonly known as: PROVENTIL;VENTOLIN;PROAIR  Inhale 2 puffs into the lungs every 4 hours as needed for Wheezing     guaiFENesin 600 MG extended release tablet  Commonly known as: MUCINEX  Take 1 tablet by mouth 2 times daily            CONTINUE taking these medications      ascorbic acid 500 MG tablet  Commonly known as: VITAMIN C     azelastine 0.1 % nasal spray  Commonly known as: ASTELIN     Biotin 1000 MCG Tabs     Cholecalciferol 10 MCG (400 UNIT) Caps Capsule  Commonly known as: Vitamin D     FAMOTIDINE PO     fluticasone-salmeterol 250-50 MCG/ACT Aepb diskus inhaler  Commonly known as: ADVAIR     hydrOXYzine HCl 25 MG tablet  Commonly known as: ATARAX     hyoscyamine 125 MCG sublingual tablet  Commonly known as: Levsin/SL  Place 1-2 tablets under the tongue every 4 hours as needed for Cramping for 5 days.      irbesartan 75 MG tablet  Commonly known as: AVAPRO     levothyroxine 125 MCG tablet  Commonly known as: SYNTHROID     Lovaza 1 g capsule  Generic drug: omega-3 acid ethyl esters     melatonin 3 MG Tabs tablet     montelukast 10 MG tablet  Commonly known as: SINGULAIR     rosuvastatin 5 MG tablet  Commonly known as: CRESTOR     therapeutic multivitamin-minerals tablet     ZYRTEC ALLERGY PO            STOP taking these medications      budesonide-formoterol 160-4.5 MCG/ACT Aero  Commonly known as: SYMBICORT     hydroCHLOROthiazide 12.5 MG capsule  Commonly known as: MICROZIDE     omeprazole 20 MG delayed release capsule  Commonly known as: PRILOSEC     valsartan 80 MG tablet  Commonly known as: DIOVAN            ASK your doctor about these medications      aspirin 81 MG tablet     ondansetron 4 MG tablet  Commonly known as: Zofran  Take 1 tablet by mouth every 8 hours as needed for Nausea. Where to Get Your Medications        These medications were sent to [address] [facility], [address])  [facility], [address] [place] [address]      Phone: [phone]   albuterol sulfate  (90 Base) MCG/ACT inhaler       You can get these medications from any pharmacy    You don't need a prescription for these medications  guaiFENesin 600 MG extended release tablet       Discharge recommendations given to patient. Follow Up. PCP in 1 week, cardiology 2 months   Disposition. home  Activity. activity as tolerated  Diet: ADULT DIET; Regular; Low Fat/Low Chol/High Fiber/2 gm Na      Spent 40 minutes in discharge process.     Signed:  CHASTITY Leyva CNP     2/17/2023 7:46 AM

## 2023-02-17 NOTE — PROGRESS NOTES
Data- discharge order received, pt verbalized agreement to discharge, disposition to previous residence, no needs for HHC/DME. Action- discharge instructions prepared and given to pt, pt verbalized understanding. Medication information packet given r/t NEW and/or CHANGED prescriptions emphasizing name/purpose/side effects, pt verbalized understanding. Discharge instruction summary: Diet- regular, Activity- up as tolerated, Primary Care Physician as followsKerline Gupta [phone] f/u appointment in 1 week, immunizations reviewed, prescription medications filled at pharmacy of choice. Inpatient surgical procedure precautions reviewed: Echo.     1. WEIGHT: Admit Weight: 178 lb 4.8 oz (80.9 kg) (02/15/23 2048)        Today  Weight: 176 lb 12.8 oz (80.2 kg) (02/17/23 0523)       2. O2 SAT.: SpO2: 96 % (02/17/23 1115)    Response- Pt belongings gathered, IV removed. Disposition is home (no HHC/DME needs), transported with belongings, taken to lobby via w/c w/ PCA, no complications.

## 2023-02-17 NOTE — PROGRESS NOTES
Cardiology RN to pt room with ordered [facility] monitor. Monitor reviewed with pt. All questions answered. Cardiology RN placed monitor on pt. Pt verbalized understanding.         Yehuda Michaels RN

## 2023-02-17 NOTE — PROGRESS NOTES
Patient sitting up in bed, alert. Patient took medications www, without complication. Patient voiced no concerns at this time. Call light in reach. Safety precautions in place.

## 2023-02-18 LAB
CULTURE, RESPIRATORY: NORMAL
GRAM STAIN RESULT: NORMAL

## 2023-02-24 ENCOUNTER — TELEPHONE (OUTPATIENT)
Dept: CARDIOLOGY CLINIC | Age: 63
End: 2023-02-24

## 2023-02-24 NOTE — TELEPHONE ENCOUNTER
Spoke with the patient and she was inquiring about sputum culture results and continues to have productive cough . Expressed to the patient that she should follow up with PCP in regards to this . Patient voiced understanding .  Call complete

## 2023-02-24 NOTE — TELEPHONE ENCOUNTER
Pt is asking to speak to CMV regarding testing and diagnoses while in the hospital. Please call to discuss

## 2023-04-07 ENCOUNTER — HOSPITAL ENCOUNTER (OUTPATIENT)
Dept: MRI IMAGING | Age: 63
Discharge: HOME | End: 2023-04-07
Payer: COMMERCIAL

## 2023-04-07 DIAGNOSIS — I42.2 HYPERTROPHIC CARDIOMYOPATHY (HCC): ICD-10-CM

## 2023-04-07 PROCEDURE — 75565 CARD MRI VELOC FLOW MAPPING: CPT

## 2023-04-07 PROCEDURE — 6360000004 HC RX CONTRAST MEDICATION: Performed by: INTERNAL MEDICINE

## 2023-04-07 PROCEDURE — A9585 GADOBUTROL INJECTION: HCPCS | Performed by: INTERNAL MEDICINE

## 2023-04-07 RX ADMIN — GADOBUTROL 12 ML: 604.72 INJECTION INTRAVENOUS at 14:17

## 2023-09-27 ENCOUNTER — OFFICE VISIT (OUTPATIENT)
Age: 63
End: 2023-09-27

## 2023-09-27 VITALS
HEART RATE: 90 BPM | WEIGHT: 178 LBS | OXYGEN SATURATION: 96 % | SYSTOLIC BLOOD PRESSURE: 133 MMHG | BODY MASS INDEX: 28.73 KG/M2 | DIASTOLIC BLOOD PRESSURE: 98 MMHG | TEMPERATURE: 98.5 F

## 2023-09-27 DIAGNOSIS — L73.8: Primary | ICD-10-CM

## 2023-09-27 RX ORDER — PREDNISONE 10 MG/1
TABLET ORAL
Qty: 42 TABLET | Refills: 0 | Status: SHIPPED | OUTPATIENT
Start: 2023-09-27

## 2023-09-27 RX ORDER — CLINDAMYCIN HYDROCHLORIDE 300 MG/1
300 CAPSULE ORAL 3 TIMES DAILY
Qty: 30 CAPSULE | Refills: 0 | Status: SHIPPED | OUTPATIENT
Start: 2023-09-27 | End: 2023-10-07

## 2023-09-27 NOTE — PATIENT INSTRUCTIONS
- Please call the clinic if you have any questions. - Maintain social distancing practices. - Review your medications with your pharmacist to check for any drug interactions. - Follow up with the clinic or your primary care provider if you have any questions and/or concerns. - Dial 911 or proceed to the nearest Emergency Department if you experience any life-threatening issues. - Steroids will cause your glucose (blood sugar) levels to rise. If you are diabetic, please monitor your glucose levels carefully. Watch your food intake and take caution with foods high in sugar and carbohydrates as weight gain is another side effect of steroid use. Elevated heart rate is another common finding with steroids. Take this medication with food as it can irritate your stomach to the same degree that ibuprofen would.

## 2024-04-18 ENCOUNTER — HOSPITAL ENCOUNTER (OUTPATIENT)
Age: 64
Setting detail: OUTPATIENT SURGERY
Discharge: HOME | End: 2024-04-18
Attending: INTERNAL MEDICINE | Admitting: INTERNAL MEDICINE
Payer: COMMERCIAL

## 2024-04-18 ENCOUNTER — ANESTHESIA EVENT (OUTPATIENT)
Dept: ENDOSCOPY | Age: 64
End: 2024-04-18
Payer: COMMERCIAL

## 2024-04-18 ENCOUNTER — ANESTHESIA (OUTPATIENT)
Dept: ENDOSCOPY | Age: 64
End: 2024-04-18
Payer: COMMERCIAL

## 2024-04-18 VITALS
BODY MASS INDEX: 28.61 KG/M2 | SYSTOLIC BLOOD PRESSURE: 127 MMHG | HEART RATE: 68 BPM | WEIGHT: 178 LBS | TEMPERATURE: 97 F | RESPIRATION RATE: 16 BRPM | OXYGEN SATURATION: 99 % | DIASTOLIC BLOOD PRESSURE: 80 MMHG | HEIGHT: 66 IN

## 2024-04-18 DIAGNOSIS — R13.14 DYSPHAGIA, PHARYNGOESOPHAGEAL: ICD-10-CM

## 2024-04-18 PROCEDURE — 6360000002 HC RX W HCPCS: Performed by: ANESTHESIOLOGY

## 2024-04-18 PROCEDURE — 7100000010 HC PHASE II RECOVERY - FIRST 15 MIN: Performed by: INTERNAL MEDICINE

## 2024-04-18 PROCEDURE — C1726 CATH, BAL DIL, NON-VASCULAR: HCPCS | Performed by: INTERNAL MEDICINE

## 2024-04-18 PROCEDURE — 6360000002 HC RX W HCPCS: Performed by: NURSE ANESTHETIST, CERTIFIED REGISTERED

## 2024-04-18 PROCEDURE — 2580000003 HC RX 258: Performed by: ANESTHESIOLOGY

## 2024-04-18 PROCEDURE — 3700000001 HC ADD 15 MINUTES (ANESTHESIA): Performed by: INTERNAL MEDICINE

## 2024-04-18 PROCEDURE — 3609017700 HC EGD DILATION GASTRIC/DUODENAL STRICTURE: Performed by: INTERNAL MEDICINE

## 2024-04-18 PROCEDURE — 2709999900 HC NON-CHARGEABLE SUPPLY: Performed by: INTERNAL MEDICINE

## 2024-04-18 PROCEDURE — 3700000000 HC ANESTHESIA ATTENDED CARE: Performed by: INTERNAL MEDICINE

## 2024-04-18 PROCEDURE — 7100000011 HC PHASE II RECOVERY - ADDTL 15 MIN: Performed by: INTERNAL MEDICINE

## 2024-04-18 PROCEDURE — 88305 TISSUE EXAM BY PATHOLOGIST: CPT

## 2024-04-18 RX ORDER — ONDANSETRON 2 MG/ML
4 INJECTION INTRAMUSCULAR; INTRAVENOUS ONCE
Status: COMPLETED | OUTPATIENT
Start: 2024-04-18 | End: 2024-04-18

## 2024-04-18 RX ORDER — DEXAMETHASONE SODIUM PHOSPHATE 4 MG/ML
4 INJECTION, SOLUTION INTRA-ARTICULAR; INTRALESIONAL; INTRAMUSCULAR; INTRAVENOUS; SOFT TISSUE ONCE
Status: COMPLETED | OUTPATIENT
Start: 2024-04-18 | End: 2024-04-18

## 2024-04-18 RX ORDER — SODIUM CHLORIDE, SODIUM LACTATE, POTASSIUM CHLORIDE, CALCIUM CHLORIDE 600; 310; 30; 20 MG/100ML; MG/100ML; MG/100ML; MG/100ML
INJECTION, SOLUTION INTRAVENOUS CONTINUOUS
Status: DISCONTINUED | OUTPATIENT
Start: 2024-04-18 | End: 2024-04-18 | Stop reason: HOSPADM

## 2024-04-18 RX ORDER — PROPOFOL 10 MG/ML
INJECTION, EMULSION INTRAVENOUS PRN
Status: DISCONTINUED | OUTPATIENT
Start: 2024-04-18 | End: 2024-04-18 | Stop reason: SDUPTHER

## 2024-04-18 RX ORDER — LIDOCAINE HYDROCHLORIDE 20 MG/ML
INJECTION, SOLUTION INTRAVENOUS PRN
Status: DISCONTINUED | OUTPATIENT
Start: 2024-04-18 | End: 2024-04-18 | Stop reason: SDUPTHER

## 2024-04-18 RX ADMIN — PROPOFOL 30 MG: 10 INJECTION, EMULSION INTRAVENOUS at 13:39

## 2024-04-18 RX ADMIN — PROPOFOL 100 MG: 10 INJECTION, EMULSION INTRAVENOUS at 13:37

## 2024-04-18 RX ADMIN — PROPOFOL 50 MG: 10 INJECTION, EMULSION INTRAVENOUS at 13:42

## 2024-04-18 RX ADMIN — PROPOFOL 50 MG: 10 INJECTION, EMULSION INTRAVENOUS at 13:47

## 2024-04-18 RX ADMIN — LIDOCAINE HYDROCHLORIDE 100 MG: 20 INJECTION, SOLUTION INTRAVENOUS at 13:37

## 2024-04-18 RX ADMIN — PROPOFOL 30 MG: 10 INJECTION, EMULSION INTRAVENOUS at 13:43

## 2024-04-18 RX ADMIN — PROPOFOL 70 MG: 10 INJECTION, EMULSION INTRAVENOUS at 13:41

## 2024-04-18 RX ADMIN — SODIUM CHLORIDE, POTASSIUM CHLORIDE, SODIUM LACTATE AND CALCIUM CHLORIDE: 600; 310; 30; 20 INJECTION, SOLUTION INTRAVENOUS at 12:31

## 2024-04-18 RX ADMIN — DEXAMETHASONE SODIUM PHOSPHATE 4 MG: 4 INJECTION INTRA-ARTICULAR; INTRALESIONAL; INTRAMUSCULAR; INTRAVENOUS; SOFT TISSUE at 12:35

## 2024-04-18 RX ADMIN — ONDANSETRON 4 MG: 2 INJECTION INTRAMUSCULAR; INTRAVENOUS at 12:34

## 2024-04-18 ASSESSMENT — PAIN - FUNCTIONAL ASSESSMENT
PAIN_FUNCTIONAL_ASSESSMENT: ACTIVITIES ARE NOT PREVENTED
PAIN_FUNCTIONAL_ASSESSMENT: 0-10

## 2024-04-18 ASSESSMENT — PAIN DESCRIPTION - DESCRIPTORS: DESCRIPTORS: DISCOMFORT;SORE

## 2024-04-18 NOTE — ANESTHESIA PRE PROCEDURE
Department of Anesthesiology  Preprocedure Note       Name:  Mary Harding   Age:  63 y.o.  :  1960                                          MRN:  [id number]         Date:  2024      Surgeon: Surgeon(s):  Yogesh Mcclure MD    Procedure: Procedure(s):  ESOPHAGOGASTRODUODENOSCOPY    Medications prior to admission:   Prior to Admission medications    Medication Sig Start Date End Date Taking? Authorizing Provider   predniSONE (DELTASONE) 10 MG tablet 60mg PO QD x 2 days, then 50mg PO QD x 2 days, and continue to decrease by 10mg every 2 days. 23   Ignacio Everett APRN - CNP   guaiFENesin (MUCINEX) 600 MG extended release tablet Take 1 tablet by mouth 2 times daily  Patient not taking: Reported on 2023   Janine Noble APRN - CNP   albuterol sulfate HFA (PROVENTIL;VENTOLIN;PROAIR) 108 (90 Base) MCG/ACT inhaler Inhale 2 puffs into the lungs every 4 hours as needed for Wheezing 23   Janine Noble APRN - CNP   ascorbic acid (VITAMIN C) 500 MG tablet Take 1 tablet by mouth 2 times daily    Pj Mcgraw MD   omega-3 acid ethyl esters (LOVAZA) 1 g capsule Take 1 capsule by mouth 2 times daily    Pj Mcgraw MD   azelastine (ASTELIN) 0.1 % nasal spray 1 spray by Nasal route 2 times daily Use in each nostril as directed  Patient not taking: Reported on 2023    Pj Mcgraw MD   Cholecalciferol (VITAMIN D) 10 MCG (400 UNIT) CAPS Capsule Take 1 capsule by mouth daily    Pj Mcgraw MD   Multiple Vitamins-Minerals (THERAPEUTIC MULTIVITAMIN-MINERALS) tablet Take 1 tablet by mouth daily    Pj Mcgraw MD   FAMOTIDINE PO Take 20 mg by mouth in the morning and at bedtime  Patient not taking: Reported on 2023    Pj Mcgraw MD   Cetirizine HCl (ZYRTEC ALLERGY PO) Take 10 mg by mouth 2 times daily 1 tablet in am and 1 in pm  Patient not taking: Reported on 2023    Pj Mcgraw MD   Biotin 1000 MCG TABS Take 1,000 mcg by

## 2024-04-18 NOTE — H&P
MG tablet Take 1 tablet by mouth at bedtime    Pj Mcgraw MD   fluticasone-salmeterol (ADVAIR) 250-50 MCG/ACT AEPB diskus inhaler Inhale 1 puff into the lungs 2 times daily    Pj Mcgraw MD   hyoscyamine (LEVSIN/SL) 0.125 MG SL tablet Place 1-2 tablets under the tongue every 4 hours as needed for Cramping for 5 days. 11/7/13 11/12/13  Kamryn Luna MD   ondansetron (ZOFRAN) 4 MG tablet Take 1 tablet by mouth every 8 hours as needed for Nausea.  Patient not taking: Reported on 2/15/2023 11/7/13   Scott Arana MD   rosuvastatin (CRESTOR) 5 MG tablet Take 5 mg by mouth every evening  Patient not taking: Reported on 9/27/2023    Pj Mcgraw MD   levothyroxine (SYNTHROID) 125 MCG tablet Take 150 mcg by mouth daily Monday through friday    Pj Mcgraw MD   aspirin 81 MG tablet Take 1 tablet by mouth daily    ProviderPj MD       Vital Signs: BP (!) 138/94   Pulse 73   Temp 97.5 °F (36.4 °C) (Temporal)   Resp 16   Ht 1.676 m (5' 6\")   Wt 80.7 kg (178 lb)   SpO2 100%   BMI 28.73 kg/m²   Physical Exam:   Heart: regular rrr  Lungs: non-labored breathing  Mental status:  Alert and oriented    ASA score:  ASA 2 - Patient with mild systemic disease with no functional limitations{  Mallimpati score:  2     Planned Procedure: EGD    Planned Sedation: Conscious sedation / Monitored anesthesia.    Signed By: JEFFREY RUEDA MD   April 18, 2024

## 2024-04-18 NOTE — ANESTHESIA POSTPROCEDURE EVALUATION
Department of Anesthesiology  Postprocedure Note    Patient: Mary Harding  MRN: [id number]  YOB: 1960  Date of evaluation: 4/18/2024    Procedure Summary       Date: 04/18/24 Room / Location: [place] [address] / [facility]    Anesthesia Start: 1323 Anesthesia Stop: 1357    Procedure: ESOPHAGOGASTRODUODENOSCOPY DILATION BALLOON ESOPHAGOGASTRODUODENOSCOPY BIOPSY Diagnosis:       Dysphagia, pharyngoesophageal      (Dysphagia, pharyngoesophageal [R13.14])    Surgeons: Yogesh Mcclure MD Responsible Provider: Ignacio Trujillo MD    Anesthesia Type: MAC ASA Status: 2            Anesthesia Type: No value filed.    Sarah Phase I: Sarah Score: 10    Sarah Phase II: Sarah Score: 10    Anesthesia Post Evaluation    Patient location during evaluation: PACU  Patient participation: complete - patient participated  Level of consciousness: awake  Pain score: 0  Airway patency: patent  Nausea & Vomiting: no nausea  Cardiovascular status: hemodynamically stable  Respiratory status: acceptable  Hydration status: stable  Pain management: satisfactory to patient    No notable events documented.

## 2024-04-18 NOTE — PROCEDURES
EGD PROCEDURE NOTE         Esophagogastroduodenoscopy Procedure Note     Patient: Mary Harding MRN: [id number]   YOB: 1960 Age: 63 y.o. Sex: female   Unit: [facility] ENDOSCOPY Room/Bed: Endo Pool/NONE Location: [facility]    Admitting Physician: JEFFREY RUEDA     Primary Care Physician: Alice Cormier      DATE OF PROCEDURE: 4/18/2024  PROCEDURE: Esophagogastroduodenoscopy  INDICATION:   Diagnostic and therapeutic EGD for esophageal dysphagia for solids and postprandial abdominal bloating discomfort.  This is a 63 y.o. year old female who presents today with recurrent esophageal dysphagia for solids.  Melodie Bonds has history of osteoporosis, HTN, recent workup for lupus that was found to be negative but she has been tested for T-cell lymphoma.  She reports remote history of esophageal dysphagia for solids in her 30s for which she required an EGD with dilation with subsequent resolution of dysphagia.  Over the last year and a half, she has had a return of dysphagia for solids, happening a couple times a month.  Separately perhaps, she has some postprandial bloating with just about every meal.  Of note, she did have a colonoscopy per routine about a month ago.    ENDOSCOPIST: JEFFREY UREDA MD        POSTOPERATIVE DIAGNOSIS:    -Esophagus contained Schatzki ring, dilated up to 13.5 mm with TTS balloon  -Stomach with 3 cm hiatal hernia, sliding-type.  Mild erosive gastritis, biopsied to rule out H. pylori.  -Duodenum unremarkable to the second portion.    In summary, the esophageal dysphagia is most likely on the basis of the Schatzki ring which in turn is probably a component of reflux related scarring.  The postprandial bloating sensation she gets could be from H. pylori infection for which we have obtain biopsies.  However, there is a long list of other considerations.    PLAN:    -We will follow-up with biopsy results and update the patient via the [employer] patient portal within 1-2

## 2024-04-18 NOTE — DISCHARGE INSTRUCTIONS
ENDOSCOPY DISCHARGE INSTRUCTIONS:    Call the physician that did your procedure for any questions or concern:    [facility]: [phone]  DR. JEFFREY RUEDA      ACTIVITY:    There are potential side effects to the medications used for sedation and anesthesia during your procedure.  These include:  Dizziness or light-headedness, confusion or memory loss, delayed reaction times, loss of coordination, nausea and vomiting.  Because of your increased risk for injury, we ask that you observe the following precautions:  For the next 24 hours,  DO NOT operate an automobile, bicycle, motorcycle, , power tools or large equipment of any kind.  Do not drink alcohol, sign any legal documents or make any legal decisions for 24 hours.  Do not bend your head over lower than your heart.  DO sit on the side of bed/couch awhile before getting up.  Plan on bedrest or quiet relaxation today.  You may resume normal activities in 24 hours.    DIET:    Your first meal today should be light, avoiding spicy and fatty foods.  If you tolerate this first meal, then you may advance to your regular diet unless otherwise advised by your physician.    NORMAL SYMPTOMS:  -Mild sore throat if you’ve had an EGD   -Gaseous discomfort    NOTIFY YOUR PHYSICIAN IF THESE SYMPTOMS OCCUR:  1. Fever (greater than 100)  5. Increased abdominal bloating  2. Severe pain    6. Excessive bleeding  3. Nausea and vomiting  7. Chest pain                                                                    4. Chills    8. Shortness of breath    ADDITIONAL INSTRUCTIONS:    Biopsy results: Call [facility] for biopsy results in 1 week    PLAN:    -We will follow-up with biopsy results and update the patient via the [employer] patient portal within 1-2 weeks.    -Start omeprazole 40 mg daily before breakfast  -If no improvement in esophageal dysphagia in 4 weeks, call and we will repeat EGD.  -Call for office follow-up in 1 to 2 months with myself or

## 2024-04-18 NOTE — PROGRESS NOTES
Ambulatory Surgery/Procedure Discharge Note    Vitals:    04/18/24 1425   BP: 127/80   Pulse: 68   Resp: 16   Temp: 97 °F (36.1 °C)   SpO2: 99%       In: 800 [I.V.:800]  Out: -     Restroom use offered before discharge.  Yes    Pain assessment:  level of pain (1-10, 10 severe),   Pain Level: 6 - ice pack given for sore mouth. Swelling to bottom lip noted. Pt advised to try soft foods today due to dilation.         Patient discharged to home/self care. Patient discharged via wheel chair by transporter to waiting family/S.O.       4/18/2024 2:39 PM

## 2024-07-13 ENCOUNTER — OFFICE VISIT (OUTPATIENT)
Age: 64
End: 2024-07-13

## 2024-07-13 VITALS
BODY MASS INDEX: 26.9 KG/M2 | HEIGHT: 66 IN | SYSTOLIC BLOOD PRESSURE: 134 MMHG | TEMPERATURE: 98.4 F | RESPIRATION RATE: 18 BRPM | OXYGEN SATURATION: 96 % | HEART RATE: 98 BPM | WEIGHT: 167.4 LBS | DIASTOLIC BLOOD PRESSURE: 88 MMHG

## 2024-07-13 DIAGNOSIS — J45.21 MILD INTERMITTENT ASTHMA WITH EXACERBATION: ICD-10-CM

## 2024-07-13 DIAGNOSIS — J01.00 ACUTE NON-RECURRENT MAXILLARY SINUSITIS: Primary | ICD-10-CM

## 2024-07-13 RX ORDER — PREDNISONE 20 MG/1
20 TABLET ORAL 2 TIMES DAILY
Qty: 10 TABLET | Refills: 0 | Status: SHIPPED | OUTPATIENT
Start: 2024-07-13 | End: 2024-07-18

## 2024-07-13 RX ORDER — AMOXICILLIN AND CLAVULANATE POTASSIUM 875; 125 MG/1; MG/1
1 TABLET, FILM COATED ORAL 2 TIMES DAILY
Qty: 14 TABLET | Refills: 0 | Status: SHIPPED | OUTPATIENT
Start: 2024-07-13 | End: 2024-07-20

## 2024-07-13 ASSESSMENT — ENCOUNTER SYMPTOMS
SINUS PRESSURE: 1
SORE THROAT: 1
SHORTNESS OF BREATH: 0
HOARSE VOICE: 1
COUGH: 1
HEMOPTYSIS: 0

## 2024-07-13 NOTE — PROGRESS NOTES
beta-agonist inhaler, OTC cough suppressant and steroid inhaler for the symptoms. The treatment provided mild relief. Her past medical history is significant for asthma and environmental allergies. There is no history of bronchitis, COPD or pneumonia.       ROS: See HPI       Vitals:    07/13/24 1102 07/13/24 1118   BP: 134/88    Site: Right Upper Arm    Position: Sitting    Cuff Size: Medium Adult    Pulse: (!) 102 98   Resp:  18   Temp: 98.4 °F (36.9 °C)    TempSrc: Oral    SpO2: 92% 96%   Weight: 75.9 kg (167 lb 6.4 oz)    Height: 1.676 m (5' 6\")        No results found for this visit on 07/13/24.     Physical Exam  Vitals and nursing note reviewed.   Constitutional:       General: She is not in acute distress.     Comments: Voice is hoarse  Occasional dry coughing  Speaking full sentences   HENT:      Right Ear: Tympanic membrane, ear canal and external ear normal.      Left Ear: Tympanic membrane, ear canal and external ear normal.      Nose: Congestion present.      Mouth/Throat:      Mouth: Mucous membranes are moist.      Pharynx: Posterior oropharyngeal erythema present. No oropharyngeal exudate.      Comments: PND  Eyes:      Conjunctiva/sclera: Conjunctivae normal.   Cardiovascular:      Rate and Rhythm: Normal rate and regular rhythm.      Heart sounds: Normal heart sounds.   Pulmonary:      Effort: Pulmonary effort is normal. No respiratory distress.      Breath sounds: No wheezing, rhonchi or rales.   Musculoskeletal:      Cervical back: Neck supple. No tenderness.   Lymphadenopathy:      Cervical: No cervical adenopathy.   Skin:     General: Skin is dry.   Neurological:      Mental Status: She is alert and oriented to person, place, and time.   Psychiatric:         Behavior: Behavior normal.              An electronic signature was used to authenticate this note.    Joaquin Paul, APRN - CNP

## 2024-07-13 NOTE — PATIENT INSTRUCTIONS
Begin treatment for probable Bacterial Sinusitis based on symptoms and exam.  Duration > 10 days, Double worsening symptoms, Facial pain and swelling with purulent discharge  AUGMENTIN BID 5-7 days  Burst of Prednisone for inflammation and history of asthma.  Review printed materials.  Continue rest, hydration..  Nasal saline rinses, steam, vapo rub can also help.  Return if symptoms change or worsen for re-evaluation.

## (undated) DEVICE — SYRINGE INFL 60ML DISP ALLIANCE II

## (undated) DEVICE — CATHETER DIL 6FR L180CM BLLN INFLATED 36-40.5-45FR L8CM ES

## (undated) DEVICE — FORCEPS BX L240CM JAW DIA2.8MM L CAP W/ NDL MIC MESH TOOTH